# Patient Record
Sex: FEMALE | Race: WHITE
[De-identification: names, ages, dates, MRNs, and addresses within clinical notes are randomized per-mention and may not be internally consistent; named-entity substitution may affect disease eponyms.]

---

## 2017-09-06 LAB
ANION GAP SERPL CALC-SCNC: 13.1 MMOL/L (ref 8–16)
APTT BLD: 27.8 SECONDS (ref 22.8–39.4)
BASOPHILS NFR BLD AUTO: 0.3 % (ref 0–2)
BUN SERPL-MCNC: 10 MG/DL (ref 7–18)
CALCIUM SERPL-MCNC: 8.8 MG/DL (ref 8.5–10.1)
CHLORIDE SERPL-SCNC: 104 MMOL/L (ref 98–107)
CO2 SERPL-SCNC: 28.6 MMOL/L (ref 21–32)
CREAT SERPL-MCNC: 0.7 MG/DL (ref 0.6–1.3)
EOSINOPHIL NFR BLD: 2.5 % (ref 0–7)
ERYTHROCYTE [DISTWIDTH] IN BLOOD BY AUTOMATED COUNT: 13.6 % (ref 11.5–14.5)
GLUCOSE SERPL-MCNC: 144 MG/DL (ref 74–106)
HCT VFR BLD CALC: 35.3 % (ref 36–48)
HGB BLD-MCNC: 11.8 G/DL (ref 12–16)
IMM GRANULOCYTES NFR BLD: 0 % (ref 0–5)
INR PPP: 0.98 (ref 0.85–1.17)
LYMPHOCYTES NFR BLD AUTO: 39.8 % (ref 15–50)
MCH RBC QN AUTO: 29.9 PG (ref 26–34)
MCHC RBC AUTO-ENTMCNC: 33.4 G/DL (ref 31–37)
MCV RBC: 89.4 FL (ref 80–100)
MONOCYTES NFR BLD: 11.7 % (ref 2–11)
NEUTROPHILS NFR BLD AUTO: 45.7 % (ref 40–80)
OSMOLALITY SERPL CALC.SUM OF ELEC: 284 MOSM/KG (ref 275–300)
PLATELET # BLD: 111 10X3/UL (ref 130–400)
PMV BLD AUTO: 12.2 FL (ref 7.4–10.4)
POTASSIUM SERPL-SCNC: 3.7 MMOL/L (ref 3.5–5.1)
PROTHROMBIN TIME: 12.8 SECONDS (ref 11.6–15)
RBC # BLD AUTO: 3.95 10X6/UL (ref 4–5.4)
SODIUM SERPL-SCNC: 142 MMOL/L (ref 136–145)
WBC # BLD AUTO: 3.2 10X3/UL (ref 4.8–10.8)

## 2017-09-07 ENCOUNTER — HOSPITAL ENCOUNTER (OUTPATIENT)
Dept: HOSPITAL 84 - D.OPS | Age: 67
Discharge: HOME | End: 2017-09-07
Attending: SURGERY
Payer: MEDICARE

## 2017-09-07 VITALS — SYSTOLIC BLOOD PRESSURE: 146 MMHG | DIASTOLIC BLOOD PRESSURE: 50 MMHG

## 2017-09-07 VITALS
WEIGHT: 211 LBS | HEIGHT: 67 IN | BODY MASS INDEX: 33.12 KG/M2 | HEIGHT: 67 IN | WEIGHT: 211 LBS | BODY MASS INDEX: 33.12 KG/M2

## 2017-09-07 DIAGNOSIS — E78.5: ICD-10-CM

## 2017-09-07 DIAGNOSIS — M10.9: ICD-10-CM

## 2017-09-07 DIAGNOSIS — E11.9: ICD-10-CM

## 2017-09-07 DIAGNOSIS — M79.7: ICD-10-CM

## 2017-09-07 DIAGNOSIS — K21.9: ICD-10-CM

## 2017-09-07 DIAGNOSIS — K42.9: ICD-10-CM

## 2017-09-07 DIAGNOSIS — Z01.812: ICD-10-CM

## 2017-09-07 DIAGNOSIS — I25.10: ICD-10-CM

## 2017-09-07 DIAGNOSIS — K43.2: Primary | ICD-10-CM

## 2017-09-07 NOTE — NUR
1045--ZOFRAN 4MG GIVEN SIVP FOR NAUSEA, COOL WASH CLOTH PROVIDED. PT
DENIES FURTHER NEEDS, WILL CONTINUE TO MONITOR. AUSTIN PATEL

## 2017-09-07 NOTE — NUR
THIS PATIENT BECOME NAUSEATED AFTER TRANSFERRING BACK TO OUTPATIENT.
CONSULTED ANESTHESIA. GIVEN ORDERS TO ADMINISTER ZOFRAN 4MG IV X1
NOW. THIS WILL BE GIVEN IN OUTPATIENT.

## 2017-09-07 NOTE — NUR
1200--PT VERBALIZES NAUSEA IS GONE, FULL LIQUID TRAY SERVED. PT
DENIES FURTHER NEEDS, WILL CONTINUE TO MONITOR. AUSTIN PATEL

## 2017-09-07 NOTE — NUR
1500--PT VOIDS WITHOUT DIFFICULTY, IV DC'D. AUSTIN PATEL
 
7232--DISCHARGE INSTRUCTIONS GIVEN, PT VERBALIZES UNDERSTANDING. PT
OFF UNIT VIA WC. AUSTIN PATEL

## 2017-09-08 NOTE — OP
PATIENT NAME:  REESE GOMEZ                             MEDICAL RECORD: [id number]
:46                                             LOCATION:D.OPS          
                                                         ADMISSION DATE:        
SURGEON:  THI GUTIERREZ MD          
 
 
DATE OF OPERATION:  2017
 
PREOPERATIVE DIAGNOSES:
1.  Ventral incisional hernia.
2.  Umbilical hernia.
3.  Diabetes mellitus.
4.  Hyperlipidemia.
5.  Coronary artery disease.
6.  Gout.
7.  Gastroesophageal reflux disease.
8.  Fibromyalgia.
 
POSTOPERATIVE DIAGNOSES:
1.  Umbilical hernia.
2.  Diabetes mellitus.
3.  Hyperlipidemia.
4.  Coronary artery disease.
5.  Gout.
6.  Gastroesophageal reflux disease.
7.  Fibromyalgia.
 
PROCEDURE:  
1.  Laparoscopic lysis of adhesions times 30 minutes.
2.  Umbilical hernia repair without mesh.
 
SURGEON:  Thi Gutierrez MD
 
REPORT OF PROCEDURE:  The patient's abdomen was prepped and draped in sterile
fashion.  A Veress needle was inserted in the left upper quadrant and the
abdomen was insufflated.  A 5-mm trocar was placed in the left lateral abdomen. 
We inspected the Veress needle and saw there was no sign of any injury to bowel
or surrounding structures.  Another 5-mm trocar was placed in the right
subcostal region.  There was noted to be some adhesions present in the midline
extending out to the right upper quadrant and underneath a right subcostal
incision from a previous open cholecystectomy.  The lysis of adhesions was
performed of this fatty omental adhesions to the anterior abdominal wall.  This
was done carefully with sharp dissection and electrocautery.  We eventually were
able to get all the tissue off the anterior abdominal wall and could inspect it
closely.  I did a thorough inspection and saw no sign of a hernia being present.
 We could not find any signs at all that there was hernia tissue visible at this
point, we inspected the periumbilical space was able to find a small umbilical
hernia.  At this point, we irrigated out the abdomen thoroughly with normal
saline.  There was some significant blood loss, it was there, but there did not
appear to be any surgical bleeding.  There seemed to be some diffuse oozing from
the omental tissue from the lysis of adhesions.  At this point, the ports and
insufflation were then removed.  A semicircular incision was made on the
inferior aspect of the umbilicus.  The umbilical tissue was transected just
below the umbilicus through the patient's hernia sac.  The hernia defect was
dissected free from the hernia sac all the way down to the fascial edges.  The
hernia defect was about 2 cm wide and about 1 cm from top to bottom.  We freed
up the surface above and below the fascia and then reapproximated the fascia
transversely using interrupted 0 Prolenes times 6.  We then tacked the umbilicus
 
 
 
OPERATIVE REPORT                               [id number]    REESE GOMEZ           
 
 
down using the interrupted 3-0 Vicryl.  After irrigating the wound out, then we
reapproximated the subcutaneous tissues with interrupted 3-0 Vicryls.  We
infused with a  total of 10 mL of the surrounding tissues of all the incisions
and then closed the incisions with subcutaneous 5-0 Monocryl.
 
COMPLICATIONS:  None.
 
CONDITION:  Stable.
 
ANESTHESIA:  General endotracheal and local.
 
BLOOD LOSS:  100 mL.
 
TRANSINT:[id number] Voice Confirmation ID: [id number] DOCUMENT ID: [id number]
                                           
                                           THI GUTIERREZ MD          
 
 
 
Electronically Signed by THI GUTIERREZ on 17 at 1345
 
 
 
 
 
 
 
 
 
 
 
 
 
 
 
 
 
 
 
 
 
 
 
 
 
 
 
CC: LOIS PEÑA MD                                         [id number]
DICTATION DATE: 1742     :     17 1100      [facility] 
                                                                      17
[facility]                                          
[address] [place] [address]

## 2018-02-05 ENCOUNTER — HOSPITAL ENCOUNTER (OUTPATIENT)
Dept: HOSPITAL 84 - D.MAMMO | Age: 68
Discharge: HOME | End: 2018-02-05
Attending: FAMILY MEDICINE
Payer: MEDICARE

## 2018-02-05 VITALS — BODY MASS INDEX: 33.1 KG/M2

## 2018-02-05 DIAGNOSIS — Z12.31: Primary | ICD-10-CM

## 2018-05-21 ENCOUNTER — HOSPITAL ENCOUNTER (OUTPATIENT)
Dept: HOSPITAL 84 - D.CT | Age: 68
Discharge: HOME | End: 2018-05-21
Attending: FAMILY MEDICINE
Payer: MEDICARE

## 2018-05-21 VITALS — BODY MASS INDEX: 33.1 KG/M2

## 2018-05-21 DIAGNOSIS — R10.9: Primary | ICD-10-CM

## 2018-06-13 ENCOUNTER — HOSPITAL ENCOUNTER (OUTPATIENT)
Dept: HOSPITAL 84 - D.LAB | Age: 68
Discharge: HOME | End: 2018-06-13
Attending: INTERNAL MEDICINE
Payer: MEDICARE

## 2018-06-13 VITALS — BODY MASS INDEX: 33.1 KG/M2

## 2018-06-13 DIAGNOSIS — R10.9: ICD-10-CM

## 2018-06-13 DIAGNOSIS — R12: ICD-10-CM

## 2018-06-13 DIAGNOSIS — R19.7: ICD-10-CM

## 2018-06-13 DIAGNOSIS — R93.8: Primary | ICD-10-CM

## 2018-06-13 LAB
ALBUMIN SERPL-MCNC: 3 G/DL (ref 3.4–5)
ALP SERPL-CCNC: 106 U/L (ref 46–116)
ALT SERPL-CCNC: 43 U/L (ref 10–68)
BILIRUB DIRECT SERPL-MCNC: 0.21 MG/DL (ref 0–0.3)
BILIRUB INDIRECT SERPL-MCNC: 0.39 MG/DL (ref 0–1)
BILIRUB SERPL-MCNC: 0.6 MG/DL (ref 0.2–1.3)
GLOBULIN SER-MCNC: 3.1 G/L
PROT SERPL-MCNC: 6.1 G/DL (ref 6.4–8.2)

## 2018-08-16 ENCOUNTER — HOSPITAL ENCOUNTER (OUTPATIENT)
Dept: HOSPITAL 84 - D.SP | Age: 68
Discharge: HOME | End: 2018-08-16
Attending: INTERNAL MEDICINE
Payer: MEDICARE

## 2018-08-16 VITALS — WEIGHT: 202.42 LBS | HEIGHT: 67 IN | BODY MASS INDEX: 31.77 KG/M2 | BODY MASS INDEX: 31.77 KG/M2

## 2018-08-16 VITALS — SYSTOLIC BLOOD PRESSURE: 126 MMHG | DIASTOLIC BLOOD PRESSURE: 50 MMHG

## 2018-08-16 DIAGNOSIS — R18.8: Primary | ICD-10-CM

## 2018-08-16 DIAGNOSIS — Z01.812: ICD-10-CM

## 2018-08-16 LAB
ANION GAP SERPL CALC-SCNC: 10.8 MMOL/L (ref 8–16)
APTT BLD: 32.8 SECONDS (ref 22.8–39.4)
BASOPHILS NFR BLD AUTO: 0.7 % (ref 0–2)
BUN SERPL-MCNC: 11 MG/DL (ref 7–18)
CALCIUM SERPL-MCNC: 8.3 MG/DL (ref 8.5–10.1)
CHLORIDE SERPL-SCNC: 106 MMOL/L (ref 98–107)
CO2 SERPL-SCNC: 29.1 MMOL/L (ref 21–32)
CREAT SERPL-MCNC: 0.7 MG/DL (ref 0.6–1.3)
EOSINOPHIL NFR BLD: 3.1 % (ref 0–7)
ERYTHROCYTE [DISTWIDTH] IN BLOOD BY AUTOMATED COUNT: 13.8 % (ref 11.5–14.5)
GLUCOSE SERPL-MCNC: 124 MG/DL (ref 74–106)
HCT VFR BLD CALC: 31.5 % (ref 36–48)
HGB BLD-MCNC: 10.1 G/DL (ref 12–16)
IMM GRANULOCYTES NFR BLD: 0 % (ref 0–5)
INR PPP: 1.16 (ref 0.85–1.17)
LYMPHOCYTES NFR BLD AUTO: 40.3 % (ref 15–50)
MACROPHAGES NFR FLD MANUAL: 19 %
MCH RBC QN AUTO: 28.2 PG (ref 26–34)
MCHC RBC AUTO-ENTMCNC: 32.1 G/DL (ref 31–37)
MCV RBC: 88 FL (ref 80–100)
MESOTHL CELL NFR FLD MANUAL: 18 %
MONOCYTES NFR BLD: 9.9 % (ref 2–11)
NEUTROPHILS NFR BLD AUTO: 46 % (ref 40–80)
NEUTROPHILS NFR FLD MANUAL: 1 %
OSMOLALITY SERPL CALC.SUM OF ELEC: 282 MOSM/KG (ref 275–300)
PLATELET # BLD: 119 10X3/UL (ref 130–400)
PMV BLD AUTO: 11.4 FL (ref 7.4–10.4)
POTASSIUM SERPL-SCNC: 3.9 MMOL/L (ref 3.5–5.1)
PROTHROMBIN TIME: 14.4 SECONDS (ref 11.6–15)
RBC # BLD AUTO: 3.58 10X6/UL (ref 4–5.4)
SODIUM SERPL-SCNC: 142 MMOL/L (ref 136–145)
WBC # BLD AUTO: 2.9 10X3/UL (ref 4.8–10.8)

## 2018-08-20 ENCOUNTER — HOSPITAL ENCOUNTER (OUTPATIENT)
Dept: HOSPITAL 84 - D.MRI | Age: 68
Discharge: HOME | End: 2018-08-20
Attending: INTERNAL MEDICINE
Payer: MEDICARE

## 2018-08-20 VITALS — BODY MASS INDEX: 31.7 KG/M2

## 2018-08-20 DIAGNOSIS — D36.9: ICD-10-CM

## 2018-08-20 DIAGNOSIS — K76.6: Primary | ICD-10-CM

## 2018-08-20 DIAGNOSIS — R93.8: ICD-10-CM

## 2018-08-20 DIAGNOSIS — I83.90: ICD-10-CM

## 2018-08-20 LAB
IRON SERPL-MCNC: 33 UG/DL (ref 35–150)
SAO2 % BLD FROM PO2: 8 % (ref 15–55)
TIBC SERPL-MCNC: 394 UG/DL (ref 260–445)
UIBC SERPL-MCNC: 361 UG/DL (ref 150–375)

## 2018-08-21 LAB
AFP-TM SERPL-MCNC: 6.2 NG/ML (ref 0–8.3)
HCV AB S/CO SERPL IA: 0.1 (ref 0–0.9)

## 2018-08-22 LAB
ACTIN IGG SERPL-ACNC: 11 UNITS (ref 0–19)
MITOCHONDRIA M2 IGG SER-ACNC: 31.5 UNITS (ref 0–20)

## 2018-09-21 ENCOUNTER — HOSPITAL ENCOUNTER (OUTPATIENT)
Dept: HOSPITAL 84 - D.SP | Age: 68
Discharge: HOME | End: 2018-09-21
Attending: INTERNAL MEDICINE
Payer: MEDICARE

## 2018-09-21 VITALS — DIASTOLIC BLOOD PRESSURE: 61 MMHG | SYSTOLIC BLOOD PRESSURE: 136 MMHG

## 2018-09-21 VITALS — HEIGHT: 67 IN | WEIGHT: 195.41 LBS | BODY MASS INDEX: 30.67 KG/M2

## 2018-09-21 DIAGNOSIS — K74.60: Primary | ICD-10-CM

## 2018-09-21 DIAGNOSIS — E11.65: ICD-10-CM

## 2018-09-21 DIAGNOSIS — E11.40: ICD-10-CM

## 2018-09-21 DIAGNOSIS — G25.81: ICD-10-CM

## 2018-09-21 DIAGNOSIS — Z01.812: ICD-10-CM

## 2018-09-21 LAB
ANION GAP SERPL CALC-SCNC: 9.8 MMOL/L (ref 8–16)
APTT BLD: 30.6 SECONDS (ref 22.8–39.4)
BASOPHILS NFR BLD AUTO: 0.3 % (ref 0–2)
BUN SERPL-MCNC: 12 MG/DL (ref 7–18)
CALCIUM SERPL-MCNC: 8.4 MG/DL (ref 8.5–10.1)
CHLORIDE SERPL-SCNC: 106 MMOL/L (ref 98–107)
CO2 SERPL-SCNC: 28.6 MMOL/L (ref 21–32)
CREAT SERPL-MCNC: 0.9 MG/DL (ref 0.6–1.3)
EOSINOPHIL NFR BLD: 2.7 % (ref 0–7)
ERYTHROCYTE [DISTWIDTH] IN BLOOD BY AUTOMATED COUNT: 15.6 % (ref 11.5–14.5)
GLUCOSE SERPL-MCNC: 133 MG/DL (ref 74–106)
HCT VFR BLD CALC: 29.9 % (ref 36–48)
HGB BLD-MCNC: 9.6 G/DL (ref 12–16)
IMM GRANULOCYTES NFR BLD: 0 % (ref 0–5)
INR PPP: 1.16 (ref 0.85–1.17)
LYMPHOCYTES NFR BLD AUTO: 39.1 % (ref 15–50)
MCH RBC QN AUTO: 26.9 PG (ref 26–34)
MCHC RBC AUTO-ENTMCNC: 32.1 G/DL (ref 31–37)
MCV RBC: 83.8 FL (ref 80–100)
MONOCYTES NFR BLD: 10.8 % (ref 2–11)
NEUTROPHILS NFR BLD AUTO: 47.1 % (ref 40–80)
OSMOLALITY SERPL CALC.SUM OF ELEC: 282 MOSM/KG (ref 275–300)
PLATELET # BLD EST: (no result) 10*3/UL
PLATELET # BLD: 94 10X3/UL (ref 130–400)
PMV BLD AUTO: 10.8 FL (ref 7.4–10.4)
POTASSIUM SERPL-SCNC: 3.4 MMOL/L (ref 3.5–5.1)
PROTHROMBIN TIME: 14.4 SECONDS (ref 11.6–15)
RBC # BLD AUTO: 3.57 10X6/UL (ref 4–5.4)
SODIUM SERPL-SCNC: 141 MMOL/L (ref 136–145)
WBC # BLD AUTO: 3 10X3/UL (ref 4.8–10.8)

## 2018-11-27 ENCOUNTER — HOSPITAL ENCOUNTER (OUTPATIENT)
Dept: HOSPITAL 84 - D.CT | Age: 68
Discharge: HOME | End: 2018-11-27
Attending: FAMILY MEDICINE
Payer: MEDICARE

## 2018-11-27 VITALS — BODY MASS INDEX: 30.6 KG/M2

## 2018-11-27 DIAGNOSIS — R10.9: Primary | ICD-10-CM

## 2018-12-05 ENCOUNTER — HOSPITAL ENCOUNTER (INPATIENT)
Dept: HOSPITAL 84 - D.MS | Age: 68
LOS: 3 days | Discharge: HOME | DRG: 433 | End: 2018-12-08
Attending: FAMILY MEDICINE | Admitting: FAMILY MEDICINE
Payer: MEDICARE

## 2018-12-05 VITALS — WEIGHT: 200 LBS | BODY MASS INDEX: 31.39 KG/M2 | HEIGHT: 67 IN | BODY MASS INDEX: 31.39 KG/M2

## 2018-12-05 VITALS — SYSTOLIC BLOOD PRESSURE: 148 MMHG | DIASTOLIC BLOOD PRESSURE: 53 MMHG

## 2018-12-05 VITALS — SYSTOLIC BLOOD PRESSURE: 148 MMHG | DIASTOLIC BLOOD PRESSURE: 47 MMHG

## 2018-12-05 DIAGNOSIS — E87.6: ICD-10-CM

## 2018-12-05 DIAGNOSIS — E78.5: ICD-10-CM

## 2018-12-05 DIAGNOSIS — E11.9: ICD-10-CM

## 2018-12-05 DIAGNOSIS — K74.60: Primary | ICD-10-CM

## 2018-12-05 DIAGNOSIS — D61.818: ICD-10-CM

## 2018-12-05 DIAGNOSIS — I10: ICD-10-CM

## 2018-12-05 DIAGNOSIS — K21.9: ICD-10-CM

## 2018-12-05 DIAGNOSIS — I25.10: ICD-10-CM

## 2018-12-05 DIAGNOSIS — L03.311: ICD-10-CM

## 2018-12-05 LAB
ALBUMIN SERPL-MCNC: 3 G/DL (ref 3.4–5)
ALP SERPL-CCNC: 67 U/L (ref 46–116)
ALT SERPL-CCNC: 21 U/L (ref 10–68)
AMYLASE SERPL-CCNC: 9 U/L (ref 25–115)
ANION GAP SERPL CALC-SCNC: 11.3 MMOL/L (ref 8–16)
APPEARANCE UR: CLEAR
BACTERIA #/AREA URNS HPF: (no result) /HPF
BASOPHILS NFR BLD AUTO: 0.3 % (ref 0–2)
BILIRUB SERPL-MCNC: 0.5 MG/DL (ref 0.2–1.3)
BILIRUB SERPL-MCNC: NEGATIVE MG/DL
BUN SERPL-MCNC: 12 MG/DL (ref 7–18)
CALCIUM SERPL-MCNC: 8.4 MG/DL (ref 8.5–10.1)
CHLORIDE SERPL-SCNC: 105 MMOL/L (ref 98–107)
CO2 SERPL-SCNC: 27.9 MMOL/L (ref 21–32)
COLOR UR: YELLOW
CREAT SERPL-MCNC: 0.7 MG/DL (ref 0.6–1.3)
EOSINOPHIL NFR BLD: 1.9 % (ref 0–7)
ERYTHROCYTE [DISTWIDTH] IN BLOOD BY AUTOMATED COUNT: 16 % (ref 11.5–14.5)
GLOBULIN SER-MCNC: 2.3 G/L
GLUCOSE SERPL-MCNC: 81 MG/DL (ref 74–106)
GLUCOSE SERPL-MCNC: NEGATIVE MG/DL
HCT VFR BLD CALC: 32.3 % (ref 36–48)
HGB BLD-MCNC: 10.7 G/DL (ref 12–16)
IMM GRANULOCYTES NFR BLD: 0.3 % (ref 0–5)
KETONES UR STRIP-MCNC: NEGATIVE MG/DL
LIPASE SERPL-CCNC: 124 U/L (ref 73–393)
LYMPHOCYTES NFR BLD AUTO: 33.5 % (ref 15–50)
MCH RBC QN AUTO: 28.9 PG (ref 26–34)
MCHC RBC AUTO-ENTMCNC: 33.1 G/DL (ref 31–37)
MCV RBC: 87.3 FL (ref 80–100)
MONOCYTES NFR BLD: 11.9 % (ref 2–11)
NEUTROPHILS NFR BLD AUTO: 52.1 % (ref 40–80)
NITRITE UR-MCNC: NEGATIVE MG/ML
OSMOLALITY SERPL CALC.SUM OF ELEC: 279 MOSM/KG (ref 275–300)
PH UR STRIP: 5 [PH] (ref 5–6)
PLATELET # BLD: 137 10X3/UL (ref 130–400)
PMV BLD AUTO: 11.1 FL (ref 7.4–10.4)
POTASSIUM SERPL-SCNC: 3.2 MMOL/L (ref 3.5–5.1)
PROT SERPL-MCNC: 5.3 G/DL (ref 6.4–8.2)
PROT UR-MCNC: NEGATIVE MG/DL
RBC # BLD AUTO: 3.7 10X6/UL (ref 4–5.4)
SODIUM SERPL-SCNC: 141 MMOL/L (ref 136–145)
SP GR UR STRIP: 1.01 (ref 1–1.02)
SQUAMOUS #/AREA URNS HPF: (no result) /HPF (ref 0–5)
UROBILINOGEN UR-MCNC: NORMAL MG/DL
WBC # BLD AUTO: 3.6 10X3/UL (ref 4.8–10.8)
WBC #/AREA URNS HPF: (no result) /HPF (ref 0–5)

## 2018-12-06 VITALS — SYSTOLIC BLOOD PRESSURE: 136 MMHG | DIASTOLIC BLOOD PRESSURE: 60 MMHG

## 2018-12-06 VITALS — SYSTOLIC BLOOD PRESSURE: 111 MMHG | DIASTOLIC BLOOD PRESSURE: 53 MMHG

## 2018-12-06 VITALS — SYSTOLIC BLOOD PRESSURE: 150 MMHG | DIASTOLIC BLOOD PRESSURE: 50 MMHG

## 2018-12-06 VITALS — DIASTOLIC BLOOD PRESSURE: 52 MMHG | SYSTOLIC BLOOD PRESSURE: 148 MMHG

## 2018-12-06 VITALS — DIASTOLIC BLOOD PRESSURE: 56 MMHG | SYSTOLIC BLOOD PRESSURE: 151 MMHG

## 2018-12-06 LAB
%HYPO/RBC NFR BLD AUTO: (no result) %
ANION GAP SERPL CALC-SCNC: 12.6 MMOL/L (ref 8–16)
BUN SERPL-MCNC: 9 MG/DL (ref 7–18)
CALCIUM SERPL-MCNC: 8.1 MG/DL (ref 8.5–10.1)
CHLORIDE SERPL-SCNC: 108 MMOL/L (ref 98–107)
CO2 SERPL-SCNC: 28.7 MMOL/L (ref 21–32)
CREAT SERPL-MCNC: 0.7 MG/DL (ref 0.6–1.3)
EOSINOPHIL NFR BLD: 2 % (ref 0–7)
ERYTHROCYTE [DISTWIDTH] IN BLOOD BY AUTOMATED COUNT: 16.1 % (ref 11.5–14.5)
GLUCOSE SERPL-MCNC: 102 MG/DL (ref 74–106)
HCT VFR BLD CALC: 32 % (ref 36–48)
HGB BLD-MCNC: 10.6 G/DL (ref 12–16)
INR PPP: 1.18 (ref 0.85–1.17)
IRON SERPL-MCNC: 57 UG/DL (ref 35–150)
LYMPHOCYTES NFR BLD AUTO: 45 % (ref 15–50)
MCH RBC QN AUTO: 28.7 PG (ref 26–34)
MCHC RBC AUTO-ENTMCNC: 33.1 G/DL (ref 31–37)
MCV RBC: 86.7 FL (ref 80–100)
MONOCYTES NFR BLD: 9 % (ref 2–11)
NEUTROPHILS NFR BLD AUTO: 44 % (ref 40–80)
OSMOLALITY SERPL CALC.SUM OF ELEC: 289 MOSM/KG (ref 275–300)
PLATELET # BLD EST: (no result) 10*3/UL
PLATELET # BLD: 117 10X3/UL (ref 130–400)
PMV BLD AUTO: 10.3 FL (ref 7.4–10.4)
POTASSIUM SERPL-SCNC: 3.3 MMOL/L (ref 3.5–5.1)
PROTHROMBIN TIME: 14.5 SECONDS (ref 11.6–15)
RBC # BLD AUTO: 3.69 10X6/UL (ref 4–5.4)
ROULEAUX BLD QL SMEAR: (no result)
SAO2 % BLD FROM PO2: 20 % (ref 15–55)
SODIUM SERPL-SCNC: 146 MMOL/L (ref 136–145)
TIBC SERPL-MCNC: 272 UG/DL (ref 260–445)
UIBC SERPL-MCNC: 215 UG/DL (ref 150–375)
WBC # BLD AUTO: 2.8 10X3/UL (ref 4.8–10.8)

## 2018-12-07 VITALS — SYSTOLIC BLOOD PRESSURE: 154 MMHG | DIASTOLIC BLOOD PRESSURE: 47 MMHG

## 2018-12-07 VITALS — DIASTOLIC BLOOD PRESSURE: 52 MMHG | SYSTOLIC BLOOD PRESSURE: 139 MMHG

## 2018-12-07 VITALS — SYSTOLIC BLOOD PRESSURE: 132 MMHG | DIASTOLIC BLOOD PRESSURE: 45 MMHG

## 2018-12-07 VITALS — DIASTOLIC BLOOD PRESSURE: 42 MMHG | SYSTOLIC BLOOD PRESSURE: 121 MMHG

## 2018-12-07 VITALS — SYSTOLIC BLOOD PRESSURE: 161 MMHG | DIASTOLIC BLOOD PRESSURE: 55 MMHG

## 2018-12-07 VITALS — SYSTOLIC BLOOD PRESSURE: 112 MMHG | DIASTOLIC BLOOD PRESSURE: 40 MMHG

## 2018-12-07 LAB
ALBUMIN SERPL-MCNC: 2.5 G/DL (ref 3.4–5)
ALP SERPL-CCNC: 67 U/L (ref 46–116)
ALT SERPL-CCNC: 17 U/L (ref 10–68)
AMYLASE FLD-CCNC: 13 U/L
ANION GAP SERPL CALC-SCNC: 11.3 MMOL/L (ref 8–16)
APTT BLD: 36.9 SECONDS (ref 22.8–39.4)
BASOPHILS NFR BLD AUTO: 0.4 % (ref 0–2)
BILIRUB SERPL-MCNC: 0.41 MG/DL (ref 0.2–1.3)
BUN SERPL-MCNC: 11 MG/DL (ref 7–18)
CALCIUM SERPL-MCNC: 7.8 MG/DL (ref 8.5–10.1)
CHLORIDE SERPL-SCNC: 106 MMOL/L (ref 98–107)
CO2 SERPL-SCNC: 26.1 MMOL/L (ref 21–32)
CREAT SERPL-MCNC: 0.7 MG/DL (ref 0.6–1.3)
EOSINOPHIL NFR BLD: 2.4 % (ref 0–7)
ERYTHROCYTE [DISTWIDTH] IN BLOOD BY AUTOMATED COUNT: 16 % (ref 11.5–14.5)
FOLATE SERPL-MCNC: 12.2 NG/ML (ref 3–?)
GLOBULIN SER-MCNC: 3.4 G/L
GLUCOSE - BODY FLUID: 90 MG/DL
GLUCOSE SERPL-MCNC: 89 MG/DL (ref 74–106)
HCT VFR BLD CALC: 28.9 % (ref 36–48)
HGB BLD-MCNC: 9.3 G/DL (ref 12–16)
IMM GRANULOCYTES NFR BLD: 0 % (ref 0–5)
INR PPP: 1.23 (ref 0.85–1.17)
LYMPHOCYTES NFR BLD AUTO: 42 % (ref 15–50)
MACROPHAGES NFR FLD MANUAL: 16 %
MCH RBC QN AUTO: 28.1 PG (ref 26–34)
MCHC RBC AUTO-ENTMCNC: 32.2 G/DL (ref 31–37)
MCV RBC: 87.3 FL (ref 80–100)
MESOTHL CELL NFR FLD MANUAL: 13 %
MONOCYTES NFR BLD: 15.2 % (ref 2–11)
NEUTROPHILS NFR BLD AUTO: 40 % (ref 40–80)
NEUTROPHILS NFR FLD MANUAL: 7 %
OSMOLALITY SERPL CALC.SUM OF ELEC: 276 MOSM/KG (ref 275–300)
PLATELET # BLD: 109 10X3/UL (ref 130–400)
PMV BLD AUTO: 11.5 FL (ref 7.4–10.4)
POTASSIUM SERPL-SCNC: 3.4 MMOL/L (ref 3.5–5.1)
PROT SERPL-MCNC: 5.9 G/DL (ref 6.4–8.2)
PROTEIN - BODY FLUID: 1.4 G/DL
PROTHROMBIN TIME: 15 SECONDS (ref 11.6–15)
RBC # BLD AUTO: 3.31 10X6/UL (ref 4–5.4)
SODIUM SERPL-SCNC: 140 MMOL/L (ref 136–145)
VIT B12 SERPL-MCNC: >2000 PG/ML (ref 232–1245)
WBC # BLD AUTO: 2.5 10X3/UL (ref 4.8–10.8)

## 2018-12-07 PROCEDURE — 0W9G3ZZ DRAINAGE OF PERITONEAL CAVITY, PERCUTANEOUS APPROACH: ICD-10-PCS | Performed by: RADIOLOGY

## 2018-12-08 VITALS — SYSTOLIC BLOOD PRESSURE: 133 MMHG | DIASTOLIC BLOOD PRESSURE: 51 MMHG

## 2018-12-08 VITALS — SYSTOLIC BLOOD PRESSURE: 112 MMHG | DIASTOLIC BLOOD PRESSURE: 47 MMHG

## 2018-12-08 VITALS — DIASTOLIC BLOOD PRESSURE: 49 MMHG | SYSTOLIC BLOOD PRESSURE: 136 MMHG

## 2018-12-08 VITALS — SYSTOLIC BLOOD PRESSURE: 136 MMHG | DIASTOLIC BLOOD PRESSURE: 49 MMHG

## 2018-12-08 VITALS — SYSTOLIC BLOOD PRESSURE: 111 MMHG | DIASTOLIC BLOOD PRESSURE: 42 MMHG

## 2018-12-08 VITALS — SYSTOLIC BLOOD PRESSURE: 119 MMHG | DIASTOLIC BLOOD PRESSURE: 88 MMHG

## 2018-12-08 LAB
ANION GAP SERPL CALC-SCNC: 9.6 MMOL/L (ref 8–16)
BASOPHILS NFR BLD AUTO: 0.4 % (ref 0–2)
BUN SERPL-MCNC: 9 MG/DL (ref 7–18)
CALCIUM SERPL-MCNC: 7.7 MG/DL (ref 8.5–10.1)
CHLORIDE SERPL-SCNC: 105 MMOL/L (ref 98–107)
CO2 SERPL-SCNC: 28.2 MMOL/L (ref 21–32)
CREAT SERPL-MCNC: 0.7 MG/DL (ref 0.6–1.3)
EOSINOPHIL NFR BLD: 2.2 % (ref 0–7)
ERYTHROCYTE [DISTWIDTH] IN BLOOD BY AUTOMATED COUNT: 15.7 % (ref 11.5–14.5)
GLUCOSE SERPL-MCNC: 88 MG/DL (ref 74–106)
HCT VFR BLD CALC: 28.3 % (ref 36–48)
HGB BLD-MCNC: 9.3 G/DL (ref 12–16)
IMM GRANULOCYTES NFR BLD: 0 % (ref 0–5)
LYMPHOCYTES NFR BLD AUTO: 46.6 % (ref 15–50)
MAGNESIUM SERPL-MCNC: 1.2 MG/DL (ref 1.8–2.4)
MCH RBC QN AUTO: 28.6 PG (ref 26–34)
MCHC RBC AUTO-ENTMCNC: 32.9 G/DL (ref 31–37)
MCV RBC: 87.1 FL (ref 80–100)
MONOCYTES NFR BLD: 12.1 % (ref 2–11)
NEUTROPHILS NFR BLD AUTO: 38.7 % (ref 40–80)
OSMOLALITY SERPL CALC.SUM OF ELEC: 275 MOSM/KG (ref 275–300)
PLATELET # BLD: 107 10X3/UL (ref 130–400)
PMV BLD AUTO: 11.2 FL (ref 7.4–10.4)
POTASSIUM SERPL-SCNC: 3.8 MMOL/L (ref 3.5–5.1)
RBC # BLD AUTO: 3.25 10X6/UL (ref 4–5.4)
SODIUM SERPL-SCNC: 139 MMOL/L (ref 136–145)
WBC # BLD AUTO: 2.3 10X3/UL (ref 4.8–10.8)

## 2019-02-19 ENCOUNTER — HOSPITAL ENCOUNTER (OUTPATIENT)
Dept: HOSPITAL 84 - D.US | Age: 69
Discharge: HOME | End: 2019-02-19
Attending: INTERNAL MEDICINE
Payer: MEDICARE

## 2019-02-19 VITALS — BODY MASS INDEX: 31.3 KG/M2

## 2019-02-19 DIAGNOSIS — K76.0: Primary | ICD-10-CM

## 2019-02-19 DIAGNOSIS — K74.60: ICD-10-CM

## 2019-02-19 DIAGNOSIS — R94.5: ICD-10-CM

## 2019-02-19 LAB
ALBUMIN SERPL-MCNC: 3.3 G/DL (ref 3.4–5)
ALP SERPL-CCNC: 74 U/L (ref 46–116)
ALT SERPL-CCNC: 30 U/L (ref 10–68)
BASOPHILS NFR BLD AUTO: 0.8 % (ref 0–2)
BILIRUB DIRECT SERPL-MCNC: 0.13 MG/DL (ref 0–0.3)
BILIRUB INDIRECT SERPL-MCNC: 0.22 MG/DL (ref 0–1)
BILIRUB SERPL-MCNC: 0.35 MG/DL (ref 0.2–1.3)
EOSINOPHIL NFR BLD: 4 % (ref 0–7)
ERYTHROCYTE [DISTWIDTH] IN BLOOD BY AUTOMATED COUNT: 14.2 % (ref 11.5–14.5)
GLOBULIN SER-MCNC: 3.7 G/L
HCT VFR BLD CALC: 30.9 % (ref 36–48)
HGB BLD-MCNC: 10.1 G/DL (ref 12–16)
IMM GRANULOCYTES NFR BLD: 0.3 % (ref 0–5)
INR PPP: 1.09 (ref 0.85–1.17)
LYMPHOCYTES NFR BLD AUTO: 31.8 % (ref 15–50)
MCH RBC QN AUTO: 28.5 PG (ref 26–34)
MCHC RBC AUTO-ENTMCNC: 32.7 G/DL (ref 31–37)
MCV RBC: 87 FL (ref 80–100)
MONOCYTES NFR BLD: 11.7 % (ref 2–11)
NEUTROPHILS NFR BLD AUTO: 51.4 % (ref 40–80)
PLATELET # BLD: 139 10X3/UL (ref 130–400)
PMV BLD AUTO: 11.6 FL (ref 7.4–10.4)
PROT SERPL-MCNC: 7 G/DL (ref 6.4–8.2)
PROTHROMBIN TIME: 13.6 SECONDS (ref 11.6–15)
RBC # BLD AUTO: 3.55 10X6/UL (ref 4–5.4)
WBC # BLD AUTO: 3.8 10X3/UL (ref 4.8–10.8)

## 2019-08-19 ENCOUNTER — HOSPITAL ENCOUNTER (OUTPATIENT)
Dept: HOSPITAL 84 - D.US | Age: 69
Discharge: HOME | End: 2019-08-19
Attending: INTERNAL MEDICINE
Payer: MEDICARE

## 2019-08-19 ENCOUNTER — HOSPITAL ENCOUNTER (INPATIENT)
Dept: HOSPITAL 84 - D.ER | Age: 69
LOS: 1 days | Discharge: HOME | DRG: 812 | End: 2019-08-20
Attending: FAMILY MEDICINE | Admitting: FAMILY MEDICINE
Payer: MEDICARE

## 2019-08-19 VITALS — BODY MASS INDEX: 31.3 KG/M2

## 2019-08-19 VITALS
HEIGHT: 67 IN | BODY MASS INDEX: 29.88 KG/M2 | HEIGHT: 67 IN | BODY MASS INDEX: 29.88 KG/M2 | WEIGHT: 190.4 LBS | WEIGHT: 190.4 LBS | BODY MASS INDEX: 29.88 KG/M2

## 2019-08-19 VITALS — SYSTOLIC BLOOD PRESSURE: 148 MMHG | DIASTOLIC BLOOD PRESSURE: 46 MMHG

## 2019-08-19 VITALS — SYSTOLIC BLOOD PRESSURE: 139 MMHG | DIASTOLIC BLOOD PRESSURE: 53 MMHG

## 2019-08-19 DIAGNOSIS — I10: ICD-10-CM

## 2019-08-19 DIAGNOSIS — K74.60: ICD-10-CM

## 2019-08-19 DIAGNOSIS — E11.65: ICD-10-CM

## 2019-08-19 DIAGNOSIS — M79.7: ICD-10-CM

## 2019-08-19 DIAGNOSIS — D64.9: Primary | ICD-10-CM

## 2019-08-19 DIAGNOSIS — K74.60: Primary | ICD-10-CM

## 2019-08-19 DIAGNOSIS — K75.81: ICD-10-CM

## 2019-08-19 DIAGNOSIS — I25.10: ICD-10-CM

## 2019-08-19 DIAGNOSIS — K21.9: ICD-10-CM

## 2019-08-19 DIAGNOSIS — E11.40: ICD-10-CM

## 2019-08-19 LAB
ALBUMIN SERPL-MCNC: 3 G/DL (ref 3.4–5)
ALBUMIN SERPL-MCNC: 3.2 G/DL (ref 3.4–5)
ALP SERPL-CCNC: 79 U/L (ref 46–116)
ALP SERPL-CCNC: 89 U/L (ref 46–116)
ALT SERPL-CCNC: 28 U/L (ref 10–68)
ALT SERPL-CCNC: 29 U/L (ref 10–68)
ANION GAP SERPL CALC-SCNC: 17.5 MMOL/L (ref 8–16)
BASOPHILS NFR BLD AUTO: 0.3 % (ref 0–2)
BILIRUB DIRECT SERPL-MCNC: 0.11 MG/DL (ref 0–0.3)
BILIRUB INDIRECT SERPL-MCNC: 0.33 MG/DL (ref 0–1)
BILIRUB SERPL-MCNC: 0.38 MG/DL (ref 0.2–1.3)
BILIRUB SERPL-MCNC: 0.44 MG/DL (ref 0.2–1.3)
BUN SERPL-MCNC: 13 MG/DL (ref 7–18)
CALCIUM SERPL-MCNC: 8.9 MG/DL (ref 8.5–10.1)
CHLORIDE SERPL-SCNC: 102 MMOL/L (ref 98–107)
CO2 SERPL-SCNC: 20.6 MMOL/L (ref 21–32)
CREAT SERPL-MCNC: 1 MG/DL (ref 0.6–1.3)
EOSINOPHIL NFR BLD: 2.2 % (ref 0–7)
ERYTHROCYTE [DISTWIDTH] IN BLOOD BY AUTOMATED COUNT: 15.2 % (ref 11.5–14.5)
ERYTHROCYTE [DISTWIDTH] IN BLOOD BY AUTOMATED COUNT: 15.3 % (ref 11.5–14.5)
GLOBULIN SER-MCNC: 3.4 G/L
GLOBULIN SER-MCNC: 3.9 G/L
GLUCOSE SERPL-MCNC: 285 MG/DL (ref 74–106)
HCT VFR BLD CALC: 23.7 % (ref 36–48)
HCT VFR BLD CALC: 25.2 % (ref 36–48)
HGB BLD-MCNC: 7.5 G/DL (ref 12–16)
HGB BLD-MCNC: 8 G/DL (ref 12–16)
IMM GRANULOCYTES NFR BLD: 0 % (ref 0–5)
INR PPP: 1.12 (ref 0.85–1.17)
LYMPHOCYTES NFR BLD AUTO: 32 % (ref 15–50)
LYMPHOCYTES NFR BLD AUTO: 33.1 % (ref 15–50)
MCH RBC QN AUTO: 24.4 PG (ref 26–34)
MCH RBC QN AUTO: 24.6 PG (ref 26–34)
MCHC RBC AUTO-ENTMCNC: 31.6 G/DL (ref 31–37)
MCHC RBC AUTO-ENTMCNC: 31.7 G/DL (ref 31–37)
MCV RBC: 77.2 FL (ref 80–100)
MCV RBC: 77.5 FL (ref 80–100)
MONOCYTES NFR BLD: 14.4 % (ref 2–11)
MONOCYTES NFR BLD: 15 % (ref 2–11)
NEUTROPHILS NFR BLD AUTO: 50 % (ref 40–80)
NEUTROPHILS NFR BLD AUTO: 52 % (ref 40–80)
OSMOLALITY SERPL CALC.SUM OF ELEC: 281 MOSM/KG (ref 275–300)
PLATELET # BLD EST: (no result) 10*3/UL
PLATELET # BLD: 113 10X3/UL (ref 130–400)
PLATELET # BLD: 130 10X3/UL (ref 130–400)
PMV BLD AUTO: 10.3 FL (ref 7.4–10.4)
PMV BLD AUTO: 10.4 FL (ref 7.4–10.4)
POTASSIUM SERPL-SCNC: 4.1 MMOL/L (ref 3.5–5.1)
PROT SERPL-MCNC: 6.4 G/DL (ref 6.4–8.2)
PROT SERPL-MCNC: 7.1 G/DL (ref 6.4–8.2)
PROTHROMBIN TIME: 13.9 SECONDS (ref 11.6–15)
RBC # BLD AUTO: 3.07 10X6/UL (ref 4–5.4)
RBC # BLD AUTO: 3.25 10X6/UL (ref 4–5.4)
ROULEAUX BLD QL SMEAR: (no result)
SODIUM SERPL-SCNC: 136 MMOL/L (ref 136–145)
WBC # BLD AUTO: 2.8 10X3/UL (ref 4.8–10.8)
WBC # BLD AUTO: 3.6 10X3/UL (ref 4.8–10.8)

## 2019-08-20 VITALS — SYSTOLIC BLOOD PRESSURE: 145 MMHG | DIASTOLIC BLOOD PRESSURE: 46 MMHG

## 2019-08-20 VITALS — SYSTOLIC BLOOD PRESSURE: 130 MMHG | DIASTOLIC BLOOD PRESSURE: 43 MMHG

## 2019-08-20 VITALS — SYSTOLIC BLOOD PRESSURE: 89 MMHG | DIASTOLIC BLOOD PRESSURE: 43 MMHG

## 2019-08-20 LAB
ALBUMIN SERPL-MCNC: 3.1 G/DL (ref 3.4–5)
ALP SERPL-CCNC: 79 U/L (ref 46–116)
ALT SERPL-CCNC: 30 U/L (ref 10–68)
ANION GAP SERPL CALC-SCNC: 14.9 MMOL/L (ref 8–16)
BASOPHILS NFR BLD AUTO: 0.3 % (ref 0–2)
BILIRUB SERPL-MCNC: 1.19 MG/DL (ref 0.2–1.3)
BUN SERPL-MCNC: 15 MG/DL (ref 7–18)
CALCIUM SERPL-MCNC: 8.4 MG/DL (ref 8.5–10.1)
CHLORIDE SERPL-SCNC: 105 MMOL/L (ref 98–107)
CO2 SERPL-SCNC: 23.2 MMOL/L (ref 21–32)
CREAT SERPL-MCNC: 1.1 MG/DL (ref 0.6–1.3)
EOSINOPHIL NFR BLD: 2.1 % (ref 0–7)
ERYTHROCYTE [DISTWIDTH] IN BLOOD BY AUTOMATED COUNT: 15.4 % (ref 11.5–14.5)
FERRITIN SERPL-MCNC: 10 NG/ML (ref 3–244)
GLOBULIN SER-MCNC: 3.2 G/L
GLUCOSE SERPL-MCNC: 168 MG/DL (ref 74–106)
HCT VFR BLD CALC: 28.8 % (ref 36–48)
HGB BLD-MCNC: 9.4 G/DL (ref 12–16)
IMM GRANULOCYTES NFR BLD: 0.3 % (ref 0–5)
IRON SERPL-MCNC: 190 UG/DL (ref 35–150)
LYMPHOCYTES NFR BLD AUTO: 36 % (ref 15–50)
MAGNESIUM SERPL-MCNC: 1.2 MG/DL (ref 1.8–2.4)
MCH RBC QN AUTO: 25.2 PG (ref 26–34)
MCHC RBC AUTO-ENTMCNC: 32.6 G/DL (ref 31–37)
MCV RBC: 77.2 FL (ref 80–100)
MONOCYTES NFR BLD: 15.4 % (ref 2–11)
NEUTROPHILS NFR BLD AUTO: 45.9 % (ref 40–80)
OSMOLALITY SERPL CALC.SUM OF ELEC: 282 MOSM/KG (ref 275–300)
PLATELET # BLD: 129 10X3/UL (ref 130–400)
PMV BLD AUTO: 11.2 FL (ref 7.4–10.4)
POTASSIUM SERPL-SCNC: 4.1 MMOL/L (ref 3.5–5.1)
PROT SERPL-MCNC: 6.3 G/DL (ref 6.4–8.2)
RBC # BLD AUTO: 3.73 10X6/UL (ref 4–5.4)
SAO2 % BLD FROM PO2: 39 % (ref 15–55)
SODIUM SERPL-SCNC: 139 MMOL/L (ref 136–145)
TIBC SERPL-MCNC: 486 UG/DL (ref 260–445)
UIBC SERPL-MCNC: 296 UG/DL (ref 150–375)
WBC # BLD AUTO: 3.3 10X3/UL (ref 4.8–10.8)

## 2019-08-20 NOTE — MORECARE
CASE MANAGEMENT DISCHARGE SUMMARY
 
 
PATIENT: REESE GOMEZ                       UNIT: [id number]
ACCOUNT#: [id number]                       ADM DATE: 19
AGE: 69     : [birth year]  SEX: F            ROOM/BED: D.1206    
AUTHOR: LACI NEWBY                             PHYSICIAN:                               
 
REFERRING PHYSICIAN: HOSSEIN STEVENS DO            
DATE OF SERVICE: 19
Discharge Plan
 
 
Patient Name: REESE GOMEZ
Facility: [facility]
Encounter #: [id number]
Medical Record #: [id number]
: 1950
Planned Disposition: Home
Anticipated Discharge Date: 19
 
Discharge Date: 2019
Expected LOS: 1
Initial Reviewer: [email or web address]
Initial Review Date: 2019
Generated: 19  12:50 pm 
 DCPIA - Discharge Planning Initial Assessment
 
Updated by [id number]: Tasha You on 19  11:48 am
*  Is the patient Alert and Oriented?
Yes
*  How many steps to enter\exit or inside your home? One *  PCP Dr. Muniz *  Pharmacy
[facility]
*  Preadmission Environment
Home with Family
*  ADLs
Independent
*  Equipment
Bedside Commode
Cane
Rolling Walker
*  List name and contact numbers for known caregivers / representatives who 
currently or will assist patient after discharge:
[facility] - [phone]
*  Verbal permission to speak to the caregivers and representatives has been 
obtained from the patient.
Yes
*  Community resources currently utilized
None
*  Additional services required to return to the preadmission environment?
 
No
*  Can the patient safely return to the preadmission environment?
Yes
*  Has this patient been hospitalized within the prior 30 days at any 
hospital?
No
 
 
 
 
 
 
Patient Name: REESE GOMEZ
Encounter #: [id number]
Page [phone]
 
 
 
 
 
Electronically Signed by LACI NEWBY on 19 at 1150
 
 
 
 
 
 
**All edits/amendments must be made on the electronic document**
 
DICTATION DATE: 19 1150     : SHANTELLE  19 1150     
RPT#: [id number]                                DC DATE:19
                                               STATUS: DIS IN  
[facility]
[address] [place] [address]
***END OF REPORT***

## 2019-08-20 NOTE — MORECARE
CASE MANAGEMENT DISCHARGE SUMMARY
 
 
PATIENT: REESE GOMEZ                       UNIT: [id number]
ACCOUNT#: [id number]                       ADM DATE: 19
AGE: 69     : [birth year]  SEX: F            ROOM/BED: D.1206    
AUTHOR: LACI NEWBY                             PHYSICIAN:                               
 
REFERRING PHYSICIAN: HOSSEIN STEVENS DO            
DATE OF SERVICE: 19
Discharge Plan
 
 
Patient Name: REESE GOMEZ
Facility: [facility]:[place]
Encounter #: [id number]
Medical Record #: [id number]
: 1950
Planned Disposition: Home
Anticipated Discharge Date: 19
 
Discharge Date: 2019
Expected LOS: 1
Initial Reviewer: [email or web address]
Initial Review Date: 2019
Generated: 19  12:59 pm 
DCP- Discharge Planning
 
Updated by [email or web address]: Tasha You on 19  10:52 am CT
Patient Name: REESE GOMEZ                                     
Admission Status: ER   
Accout number: [id number]                              
Admission Date: 2019   
: 1950                                                        
Admission Diagnosis:   
Attending: HOSSEIN STEVENS                                                
Current LOS:  1   
  
Anticipated DC Date: 2019   
Planned Disposition: Home   
Primary Insurance: MEDICARE A & B   
  
  
Discharge Planning Comments:   
  
DC PLAN: Return home with her  independently.   
DC NEEDS: Denied dc needs at this time.  
  
CM met with patient to complete initial dc planning assessment.  CM educated 
patient on the CM role and verbal consent given by patient to complete 
 
assessment.  CM verified patient's address, phone number, and emergency 
contact phone numbers.  Patient lives at home with her .  At discharge 
patient plans to return home  and feels this is a safe discharge. Patient is 
discharging home today.   CM discussed availability of home health, rehab 
services, and medical equipment. Patient denied discharge needs at this time. 
Patient reports her   will transport her home at time of discharge. CM 
will continue to follow and will assist as needed with dc plans/needs.    
  
: Tasha You RN, [facility]
 DCPIA - Discharge Planning Initial Assessment
 
Updated by [email or web address]: Tasha You on 19  11:48 am
*  Is the patient Alert and Oriented?
Yes
*  How many steps to enter\exit or inside your home? One *  PCP Dr. Muniz *  Pharmacy
[facility]
*  Preadmission Environment
Home with Family
*  ADLs
Independent
*  Equipment
Bedside Commode
Cane
Rolling Walker
*  List name and contact numbers for known caregivers / representatives who 
currently or will assist patient after discharge:
[facility] - [phone]
*  Verbal permission to speak to the caregivers and representatives has been 
obtained from the patient.
Yes
*  Community resources currently utilized
None
*  Additional services required to return to the preadmission environment?
No
*  Can the patient safely return to the preadmission environment?
Yes
*  Has this patient been hospitalized within the prior 30 days at any 
hospital?
No
 
 
 
 
 
 
 
Last DP export: 19  10:50 a
Patient Name: REESE GOMEZ
 
Encounter #: [id number]
Page [phone]
 
 
 
 
 
Electronically Signed by LACI NEWBY on 19 at 1159
 
 
 
 
 
 
**All edits/amendments must be made on the electronic document**
 
DICTATION DATE: 19 1159     : SHANTELLE  19 1159     
RPT#: [id number]                                DC DATE:19
                                               STATUS: DIS IN  
[facility]
[address] [place] [address]
***END OF REPORT***

## 2019-11-15 ENCOUNTER — HOSPITAL ENCOUNTER (OUTPATIENT)
Dept: HOSPITAL 84 - D.HCCECHO | Age: 69
Discharge: HOME | End: 2019-11-15
Attending: INTERNAL MEDICINE
Payer: MEDICARE

## 2019-11-15 VITALS — BODY MASS INDEX: 29.8 KG/M2

## 2019-11-15 DIAGNOSIS — I08.1: Primary | ICD-10-CM

## 2019-11-19 NOTE — EC
PATIENT:REESE GOMEZ                   DATE OF SERVICE: 11/15/19
SEX: F                                  MEDICAL RECORD: [id number]
DATE OF BIRTH: [birth year]                        [facility]          
AGE OF PATIENT: 69                             ADMISSION DATE: 11/15/19
 
REFERRING PHYSICIAN:                               
 
INTERPRETING PHYSICIAN: BENEDICTO BALLARD MD          
 
 
 
                             ECHOCARDIOGRAM REPORT
  ECHO CHARGES 4               ECHO COMPLETE                 Date: 11/15/19
 
 
 
CLINICAL DIAGNOSIS: FATIGUE                       
                    H/O HTN/CAD                   
                         ECHOCARDIOGRAPHIC MEASUREMENTS
      (adult normal given)
   AC root (d.<3.7cm) 3.0  cm   LV Septum d (<1.2 cm> 1.1  cm
      Valve Excursion 1.5  cm     LV Septum (systole) 1.5  cm
Left Atria (s.<4.0cm> 4.6  cm          LVPW d(<1.2cm) 1.0  cm
        RV (d.<2.3cm) 2.7  cm           LVPW (sytole) 1.9  cm
  LV diastole(<5.6CM) 5.8  cm       MV E-F(>70mm/sec)      cm
           LV systole 3.2  cm           LVOT Diameter 2.0  cm
       MV exc.(>10mm)      cm
Est.ejection fraction (50-75%)     %
 
   DOPPLER:
     LVIT      cm/sec A 50.0 cm/sec E 120   cm/sec
       LA      cm/sec      RVSP 46.0 mmHg
     LVOT 72.0 cm/sec   AOP1/2T      m/s
  Asc. Ao 161  cm/sec
     RVOT 59.0 cm/sec
       RA      cm/sec
       PA 91.0 cm/sec
 AV Gradient Peak 10.4 mmHg  AV Mean 5.5  mmHg  AV Area 1.3  cm
 MV Gradient Peak 7.3  mmHg  MV Mean 2.2  mmHg  MV Area      cm
   COMMENTS: OP - HC                                      
 
 
 Cardiac Sonographer: 1               VINOD CHRAY            
      Cardiologist: 3          Dr. Vegas             
             TAPE# PACS           
                                       Pericardial Effusion N                        
 
 
DATE OF SERVICE:  
 
Adequate 2D, color flow imaging, spectral Doppler, and M-Mode 
 
No LVH.  LV internal dimensions are normal.  Wall motion is normal.  EF is
greater than or equal to 55%.  Aortic valve is sclerotic.  There is no evidence
of stenosis by Doppler interrogation.  Left atrium is dilated at 4.6 cm.  Mitral
valve shows no prolapse.  Mild MR.  Right-sided chambers are grossly normal. 
Mild-to-moderate TR.
 
 
 
 
ECHOCARDIOGRAM REPORT                          [id number]    REESE GOMEZ           
 
 
TRANSINT:[id number] Voice Confirmation ID: [id number] DOCUMENT ID: [id number]
                                           
                                           BENEDICTO BALLARD MD          
 
 
 
Electronically Signed by BENEDICTO BALLARD on 11/19/19 at 1443
 
 
 
 
 
 
 
 
 
 
 
 
 
 
 
 
 
 
 
 
 
 
 
 
 
 
 
 
 
 
 
 
 
 
 
 
 
 
 
CC:                                                             [id number]
DICTATION DATE: 11/19/19 1304     :     11/19/19 1353      DEP CLI 
                                                                      11/15/19
[facility]                                          
[address] [id number]

## 2019-12-10 ENCOUNTER — HOSPITAL ENCOUNTER (OUTPATIENT)
Dept: HOSPITAL 84 - D.LAB | Age: 69
Discharge: HOME | End: 2019-12-10
Attending: INTERNAL MEDICINE
Payer: MEDICARE

## 2019-12-10 VITALS — BODY MASS INDEX: 29.8 KG/M2

## 2019-12-10 DIAGNOSIS — K74.60: Primary | ICD-10-CM

## 2019-12-10 LAB
ALBUMIN SERPL-MCNC: 3 G/DL (ref 3.4–5)
ALP SERPL-CCNC: 105 U/L (ref 46–116)
ALT SERPL-CCNC: 30 U/L (ref 10–68)
BASOPHILS NFR BLD AUTO: 0.3 % (ref 0–2)
BILIRUB DIRECT SERPL-MCNC: 0.14 MG/DL (ref 0–0.3)
BILIRUB INDIRECT SERPL-MCNC: 0.26 MG/DL (ref 0–1)
BILIRUB SERPL-MCNC: 0.4 MG/DL (ref 0.2–1.3)
EOSINOPHIL NFR BLD: 2.1 % (ref 0–7)
ERYTHROCYTE [DISTWIDTH] IN BLOOD BY AUTOMATED COUNT: 14 % (ref 11.5–14.5)
GLOBULIN SER-MCNC: 3.1 G/L
HCT VFR BLD CALC: 29.3 % (ref 36–48)
HGB BLD-MCNC: 9.5 G/DL (ref 12–16)
IMM GRANULOCYTES NFR BLD: 0 % (ref 0–5)
INR PPP: 1.18 (ref 0.85–1.17)
LYMPHOCYTES NFR BLD AUTO: 37.1 % (ref 15–50)
MCH RBC QN AUTO: 29 PG (ref 26–34)
MCHC RBC AUTO-ENTMCNC: 32.4 G/DL (ref 31–37)
MCV RBC: 89.3 FL (ref 80–100)
MONOCYTES NFR BLD: 12.2 % (ref 2–11)
NEUTROPHILS NFR BLD AUTO: 48.3 % (ref 40–80)
PLATELET # BLD: 132 10X3/UL (ref 130–400)
PMV BLD AUTO: 10.6 FL (ref 7.4–10.4)
PROT SERPL-MCNC: 6.1 G/DL (ref 6.4–8.2)
PROTHROMBIN TIME: 14.5 SECONDS (ref 11.6–15)
RBC # BLD AUTO: 3.28 10X6/UL (ref 4–5.4)
WBC # BLD AUTO: 3.4 10X3/UL (ref 4.8–10.8)

## 2019-12-23 ENCOUNTER — HOSPITAL ENCOUNTER (OUTPATIENT)
Dept: HOSPITAL 84 - D.OPS | Age: 69
Discharge: HOME | End: 2019-12-23
Attending: INTERNAL MEDICINE
Payer: MEDICARE

## 2019-12-23 VITALS — SYSTOLIC BLOOD PRESSURE: 118 MMHG | DIASTOLIC BLOOD PRESSURE: 63 MMHG

## 2019-12-23 VITALS — HEIGHT: 67 IN | WEIGHT: 195.41 LBS | BODY MASS INDEX: 30.67 KG/M2

## 2019-12-23 DIAGNOSIS — E72.20: ICD-10-CM

## 2019-12-23 DIAGNOSIS — K74.60: ICD-10-CM

## 2019-12-23 DIAGNOSIS — R18.8: ICD-10-CM

## 2019-12-23 DIAGNOSIS — K76.6: ICD-10-CM

## 2019-12-23 DIAGNOSIS — R12: ICD-10-CM

## 2019-12-23 DIAGNOSIS — K21.9: ICD-10-CM

## 2019-12-23 DIAGNOSIS — I85.00: Primary | ICD-10-CM

## 2019-12-23 DIAGNOSIS — I86.4: ICD-10-CM

## 2019-12-23 LAB
ALBUMIN SERPL-MCNC: 3.2 G/DL (ref 3.4–5)
ALP SERPL-CCNC: 105 U/L (ref 46–116)
ALT SERPL-CCNC: 33 U/L (ref 10–68)
ANION GAP SERPL CALC-SCNC: 15.3 MMOL/L (ref 8–16)
APTT BLD: 29.4 SECONDS (ref 22.8–39.4)
BASOPHILS NFR BLD AUTO: 0.3 % (ref 0–2)
BILIRUB SERPL-MCNC: 0.5 MG/DL (ref 0.2–1.3)
BUN SERPL-MCNC: 15 MG/DL (ref 7–18)
CALCIUM SERPL-MCNC: 8.4 MG/DL (ref 8.5–10.1)
CHLORIDE SERPL-SCNC: 104 MMOL/L (ref 98–107)
CO2 SERPL-SCNC: 25.5 MMOL/L (ref 21–32)
CREAT SERPL-MCNC: 1.2 MG/DL (ref 0.6–1.3)
EOSINOPHIL NFR BLD: 2.1 % (ref 0–7)
ERYTHROCYTE [DISTWIDTH] IN BLOOD BY AUTOMATED COUNT: 13.8 % (ref 11.5–14.5)
GLOBULIN SER-MCNC: 3.6 G/L
GLUCOSE SERPL-MCNC: 184 MG/DL (ref 74–106)
HCT VFR BLD CALC: 28.2 % (ref 36–48)
HGB BLD-MCNC: 9.1 G/DL (ref 12–16)
IMM GRANULOCYTES NFR BLD: 0.3 % (ref 0–5)
INR PPP: 1.17 (ref 0.85–1.17)
LYMPHOCYTES NFR BLD AUTO: 33.4 % (ref 15–50)
MCH RBC QN AUTO: 28.5 PG (ref 26–34)
MCHC RBC AUTO-ENTMCNC: 32.3 G/DL (ref 31–37)
MCV RBC: 88.4 FL (ref 80–100)
MONOCYTES NFR BLD: 12.6 % (ref 2–11)
NEUTROPHILS NFR BLD AUTO: 51.3 % (ref 40–80)
OSMOLALITY SERPL CALC.SUM OF ELEC: 286 MOSM/KG (ref 275–300)
PLATELET # BLD: 120 10X3/UL (ref 130–400)
PMV BLD AUTO: 11.2 FL (ref 7.4–10.4)
POTASSIUM SERPL-SCNC: 3.8 MMOL/L (ref 3.5–5.1)
PROT SERPL-MCNC: 6.8 G/DL (ref 6.4–8.2)
PROTHROMBIN TIME: 14.3 SECONDS (ref 11.6–15)
RBC # BLD AUTO: 3.19 10X6/UL (ref 4–5.4)
SODIUM SERPL-SCNC: 141 MMOL/L (ref 136–145)
WBC # BLD AUTO: 3.3 10X3/UL (ref 4.8–10.8)

## 2019-12-23 NOTE — OP
PATIENT NAME:  REESE GOMEZ                             MEDICAL RECORD: [id number]
:46                                             LOCATION:D.OPS          
                                                         ADMISSION DATE:        
SURGEON:  DEVAN CALDERON DO             
 
 
DATE OF OPERATION:  2019
 
PROCEDURE:  EGD with variceal band ligation and biopsies.
 
INDICATIONS FOR PROCEDURE:  History of esophageal varices, ascites, heartburn,
GERD, hyperammonemia.
 
SCOPE:  Olympus video gastroscope.
 
MEDICATIONS:  Propofol 150 mg IV per anesthesia.
 
ESTIMATED BLOOD LOSS:  Minimal.
 
COMPLICATIONS:  None.
 
FINDINGS:  Informed consent was given.  The patient was made comfortable with
the above medication.  After reaching an adequate level of sedation by slow IV
push, the patient was placed on her left side.  The endoscope was advanced under
direct visualization through the mouth to the second portion of the duodenum
with ease.  In the esophagus, there was evidence of grade II to grade III
esophageal varices located in the mid to distal esophagus.  It was really just
one column that required banding.  Two bands were placed using the [employer] speed .  At the GE junction, the squamocolumnar junction
appeared normal.  The endoscope was advanced beyond the GE junction into the
stomach and retroflexed to view the cardia and fundus.  There were no obvious
esophageal varices.  There was no hiatal hernia.  Throughout the stomach, there
was evidence of mild portal hypertensive gastropathy.  There was some mild
gastritis in the antrum and prepyloric region characterized by erythema,
granularity, and friability.  Cold forceps biopsies were taken to submit for
histopathology and to rule out the presence of H. pylori.  The endoscope was
advanced beyond the pylorus into the duodenum, which appeared normal to the
second portion.  The endoscope was then withdrawn from the patient.  The patient
tolerated the procedure well and there were no complications.
 
IMPRESSION:
1.  Grade II to grade III esophageal varices, banding performed times 2.
2.  Mild portal hypertensive gastropathy.
3.  Mild gastritis of the antrum.
 
PLAN AND RECOMMENDATIONS:
1.  Discharge home when recovery parameters are met.
2.  Follow up biopsy specimen results.
3.  GERD diet and reflux precautions as well as a low sodium diet regarding the
patient's cirrhosis.
4.  Continue current medications including omeprazole 20 mg daily.
5.  Repeat EGD in 4-6 weeks for further banding as indicated.
 
TRANSINT:[id number] Voice Confirmation ID: [id number] DOCUMENT ID: [id number]
                                           
 
 
 
OPERATIVE REPORT                               [id number]    REESE GOMEZ NATHAN A DO             
 
 
 
 
 
 
 
 
 
 
 
 
 
 
 
 
 
 
 
 
 
 
 
 
 
 
 
 
 
 
 
 
 
 
 
 
 
 
 
 
 
 
 
 
 
 
CC:                                                             [id number]
DICTATION DATE: 19     :     19 1229      [facility] 
                                                                      19
[place] [address] [place] [id number]

## 2020-01-30 ENCOUNTER — HOSPITAL ENCOUNTER (OUTPATIENT)
Dept: HOSPITAL 84 - D.OPS | Age: 70
Discharge: HOME | End: 2020-01-30
Attending: INTERNAL MEDICINE
Payer: MEDICARE

## 2020-01-30 VITALS — DIASTOLIC BLOOD PRESSURE: 35 MMHG | SYSTOLIC BLOOD PRESSURE: 120 MMHG

## 2020-01-30 VITALS — BODY MASS INDEX: 30.35 KG/M2 | HEIGHT: 67 IN | WEIGHT: 193.4 LBS

## 2020-01-30 DIAGNOSIS — D64.9: Primary | ICD-10-CM

## 2020-06-02 ENCOUNTER — HOSPITAL ENCOUNTER (OUTPATIENT)
Dept: HOSPITAL 84 - D.LAB | Age: 70
Discharge: HOME | End: 2020-06-02
Attending: INTERNAL MEDICINE
Payer: MEDICARE

## 2020-06-02 VITALS — BODY MASS INDEX: 30.4 KG/M2

## 2020-06-02 DIAGNOSIS — K90.49: ICD-10-CM

## 2020-06-02 DIAGNOSIS — D51.9: ICD-10-CM

## 2020-06-02 DIAGNOSIS — D50.9: ICD-10-CM

## 2020-06-02 DIAGNOSIS — C53.8: Primary | ICD-10-CM

## 2020-06-02 DIAGNOSIS — D50.0: ICD-10-CM

## 2020-06-02 DIAGNOSIS — D64.9: ICD-10-CM

## 2020-06-02 DIAGNOSIS — D64.81: ICD-10-CM

## 2020-06-02 LAB
ALBUMIN SERPL-MCNC: 3.5 G/DL (ref 3.4–5)
ALP SERPL-CCNC: 82 U/L (ref 30–120)
ALT SERPL-CCNC: 40 U/L (ref 10–68)
ANION GAP SERPL CALC-SCNC: 18.1 MMOL/L (ref 8–16)
BASOPHILS NFR BLD AUTO: 0.3 % (ref 0–2)
BILIRUB SERPL-MCNC: 0.54 MG/DL (ref 0.2–1.3)
BUN SERPL-MCNC: 20 MG/DL (ref 7–18)
CALCIUM SERPL-MCNC: 9 MG/DL (ref 8.5–10.1)
CHLORIDE SERPL-SCNC: 99 MMOL/L (ref 98–107)
CO2 SERPL-SCNC: 21 MMOL/L (ref 21–32)
CREAT SERPL-MCNC: 1.6 MG/DL (ref 0.6–1.3)
EOSINOPHIL NFR BLD: 2.2 % (ref 0–7)
ERYTHROCYTE [DISTWIDTH] IN BLOOD BY AUTOMATED COUNT: 14.3 % (ref 11.5–14.5)
GLOBULIN SER-MCNC: 3.1 G/L
GLUCOSE SERPL-MCNC: 242 MG/DL (ref 74–106)
HCT VFR BLD CALC: 27 % (ref 36–48)
HGB BLD-MCNC: 8.6 G/DL (ref 12–16)
IMM GRANULOCYTES NFR BLD: 0.3 % (ref 0–5)
LYMPHOCYTES NFR BLD AUTO: 34.3 % (ref 15–50)
MCH RBC QN AUTO: 29.5 PG (ref 26–34)
MCHC RBC AUTO-ENTMCNC: 31.9 G/DL (ref 31–37)
MCV RBC: 92.5 FL (ref 80–100)
MONOCYTES NFR BLD: 13 % (ref 2–11)
NEUTROPHILS NFR BLD AUTO: 49.9 % (ref 40–80)
OSMOLALITY SERPL CALC.SUM OF ELEC: 276 MOSM/KG (ref 275–300)
PLATELET # BLD: 137 10X3/UL (ref 130–400)
PMV BLD AUTO: 10.5 FL (ref 7.4–10.4)
POTASSIUM SERPL-SCNC: 5.1 MMOL/L (ref 3.5–5.1)
PROT SERPL-MCNC: 6.6 G/DL (ref 6.4–8.2)
RBC # BLD AUTO: 2.92 10X6/UL (ref 4–5.4)
SODIUM SERPL-SCNC: 133 MMOL/L (ref 136–145)
WBC # BLD AUTO: 3.2 10X3/UL (ref 4.8–10.8)

## 2020-07-08 ENCOUNTER — HOSPITAL ENCOUNTER (OUTPATIENT)
Dept: HOSPITAL 84 - D.OPS | Age: 70
Discharge: HOME | End: 2020-07-08
Attending: INTERNAL MEDICINE
Payer: MEDICARE

## 2020-07-08 VITALS — BODY MASS INDEX: 29.88 KG/M2 | WEIGHT: 190.4 LBS | BODY MASS INDEX: 29.88 KG/M2 | HEIGHT: 67 IN

## 2020-07-08 VITALS — DIASTOLIC BLOOD PRESSURE: 49 MMHG | SYSTOLIC BLOOD PRESSURE: 144 MMHG

## 2020-07-08 DIAGNOSIS — D64.9: Primary | ICD-10-CM

## 2020-07-08 NOTE — NUR
1940 NO PROBLEMS WITH BLOOD, IV DC'D WITH CATH INTACT  DC INSTS
GIVEN, VOICED UNDERSTANDING, UP TO BR VOIDS QS, RELEASED IN WC.

## 2020-07-08 NOTE — NUR
NOTIFIED BABATUNDE MALDONADO IN INFECTION CONTROL ABOUT PREVIOUS OVERNIGHT STAY FOR
BLOOD.  NOT CONSIDERED HIGH RISK.  NO ACTIONS NEEDED.

## 2020-07-08 NOTE — NUR
1605 1ST UNIT OF BLOOD CHECKED AT BEDSIDE BY THIS NURSE AND ARTEMIO FINNEGAN RN. INITIATED AT 50/CC/HR.
1620 DENIES PROBLEMS WITH BLOOD, RATE INCREASED /CC/HR..
1635 ASSISSTED UP TO BR VOIDS LG AMT.
1650 DENIES PROBLEMS WITH BLOOD. IV SITE GOOD. CALL LIGHT AT SIDE.
WATCHING TV.

## 2020-07-08 NOTE — NUR
1705 BLOOD INFUSING WELL, JUST OVER HALF COMPLETED FIRST UNIT, DENIES
PROBLEMS. IV SITE GOOD. WATCHING TV.

## 2020-07-08 NOTE — NUR
1745 1ST UNIT BLOOD HAS COMPLETED, LINE FLUSHED WITH NS.
1750 2ND UNIT BLOOD CHECKED AT BEDSIDE BY THIS NURSE AND ARTEMIO FINNEGAN RN, INITIATED AT 50/CC/HR. WATER SERVED.

## 2020-08-21 ENCOUNTER — HOSPITAL ENCOUNTER (OUTPATIENT)
Dept: HOSPITAL 84 - D.LAB | Age: 70
Discharge: HOME | End: 2020-08-21
Attending: INTERNAL MEDICINE
Payer: MEDICARE

## 2020-08-21 VITALS — BODY MASS INDEX: 29.8 KG/M2

## 2020-08-21 DIAGNOSIS — K74.60: Primary | ICD-10-CM

## 2020-08-21 LAB
ALBUMIN SERPL-MCNC: 3.1 G/DL (ref 3.4–5)
ALP SERPL-CCNC: 83 U/L (ref 30–120)
ALT SERPL-CCNC: 33 U/L (ref 10–68)
BILIRUB DIRECT SERPL-MCNC: 0.17 MG/DL (ref 0–0.3)
BILIRUB INDIRECT SERPL-MCNC: 0.36 MG/DL (ref 0–1)
BILIRUB SERPL-MCNC: 0.53 MG/DL (ref 0.2–1.3)
EOSINOPHIL NFR BLD: 1 % (ref 0–7)
ERYTHROCYTE [DISTWIDTH] IN BLOOD BY AUTOMATED COUNT: 21.3 % (ref 11.5–14.5)
GLOBULIN SER-MCNC: 3.1 G/L
HCT VFR BLD CALC: 24.6 % (ref 36–48)
HGB BLD-MCNC: 8.1 G/DL (ref 12–16)
INR PPP: 1.09 (ref 0.85–1.17)
LYMPHOCYTES NFR BLD AUTO: 30 % (ref 15–50)
MCH RBC QN AUTO: 30.1 PG (ref 26–34)
MCHC RBC AUTO-ENTMCNC: 32.9 G/DL (ref 31–37)
MCV RBC: 91.4 FL (ref 80–100)
MONOCYTES NFR BLD: 15 % (ref 2–11)
NEUTROPHILS NFR BLD AUTO: 53 % (ref 40–80)
PLATELET # BLD EST: NORMAL 10*3/UL
PLATELET # BLD: 129 10X3/UL (ref 130–400)
PMV BLD AUTO: 10.3 FL (ref 7.4–10.4)
PROT SERPL-MCNC: 6.2 G/DL (ref 6.4–8.2)
PROTHROMBIN TIME: 14 SECONDS (ref 11.6–15)
RBC # BLD AUTO: 2.69 10X6/UL (ref 4–5.4)
WBC # BLD AUTO: 2.7 10X3/UL (ref 4.8–10.8)

## 2021-02-23 NOTE — NUR
CONFIRMED WITH PT TIME OF ARRIVAL, NPO STATUS, STOPPED THINNER LAST THURSDAY,
PT HAS . PT VERBALIZED UNDERSTANDING.

## 2021-02-24 ENCOUNTER — HOSPITAL ENCOUNTER (OUTPATIENT)
Dept: HOSPITAL 84 - D.SP | Age: 71
Discharge: HOME | End: 2021-02-24
Attending: INTERNAL MEDICINE
Payer: MEDICARE

## 2021-02-24 VITALS — BODY MASS INDEX: 30.6 KG/M2 | WEIGHT: 190.4 LBS | HEIGHT: 66 IN

## 2021-02-24 VITALS — DIASTOLIC BLOOD PRESSURE: 48 MMHG | SYSTOLIC BLOOD PRESSURE: 130 MMHG

## 2021-02-24 DIAGNOSIS — E07.9: ICD-10-CM

## 2021-02-24 DIAGNOSIS — E11.9: ICD-10-CM

## 2021-02-24 DIAGNOSIS — I10: ICD-10-CM

## 2021-02-24 DIAGNOSIS — R18.8: Primary | ICD-10-CM

## 2021-02-24 LAB
ALBUMIN SERPL-MCNC: 2.7 G/DL (ref 3.4–5)
ALP SERPL-CCNC: 84 U/L (ref 30–120)
ALT SERPL-CCNC: 18 U/L (ref 10–68)
ANION GAP SERPL CALC-SCNC: 15.7 MMOL/L (ref 8–16)
APTT BLD: 29.9 SECONDS (ref 22.8–39.4)
BASOPHILS NFR BLD AUTO: 0.2 % (ref 0–2)
BILIRUB SERPL-MCNC: 0.45 MG/DL (ref 0.2–1.3)
BUN SERPL-MCNC: 22 MG/DL (ref 7–18)
CALCIUM SERPL-MCNC: 8.6 MG/DL (ref 8.5–10.1)
CHLORIDE SERPL-SCNC: 103 MMOL/L (ref 98–107)
CO2 SERPL-SCNC: 21.6 MMOL/L (ref 21–32)
CREAT SERPL-MCNC: 1.7 MG/DL (ref 0.6–1.3)
EOSINOPHIL NFR BLD: 2.8 % (ref 0–7)
ERYTHROCYTE [DISTWIDTH] IN BLOOD BY AUTOMATED COUNT: 16.7 % (ref 11.5–14.5)
GLOBULIN SER-MCNC: 3.3 G/L
GLUCOSE SERPL-MCNC: 84 MG/DL (ref 74–106)
HCT VFR BLD CALC: 22.9 % (ref 36–48)
HGB BLD-MCNC: 7.1 G/DL (ref 12–16)
IMM GRANULOCYTES NFR BLD: 0.2 % (ref 0–5)
INR PPP: 1.29 (ref 0.85–1.17)
LYMPHOCYTES # BLD: 1.31 10X3/UL (ref 1.18–3.74)
LYMPHOCYTES NFR BLD AUTO: 24.7 % (ref 15–50)
MCH RBC QN AUTO: 27.4 PG (ref 26–34)
MCHC RBC AUTO-ENTMCNC: 31 G/DL (ref 31–37)
MCV RBC: 88.4 FL (ref 80–100)
MONOCYTES NFR BLD: 12.3 % (ref 2–11)
NEUTROPHILS # BLD: 3.17 10X3/UL (ref 1.56–6.13)
NEUTROPHILS NFR BLD AUTO: 59.8 % (ref 40–80)
OSMOLALITY SERPL CALC.SUM OF ELEC: 273 MOSM/KG (ref 275–300)
PLATELET # BLD: 212 10X3/UL (ref 130–400)
PMV BLD AUTO: 10 FL (ref 7.4–10.4)
POTASSIUM SERPL-SCNC: 4.3 MMOL/L (ref 3.5–5.1)
PROT SERPL-MCNC: 6 G/DL (ref 6.4–8.2)
PROTHROMBIN TIME: 14.9 SECONDS (ref 11.6–15)
RBC # BLD AUTO: 2.59 10X6/UL (ref 4–5.4)
SODIUM SERPL-SCNC: 136 MMOL/L (ref 136–145)
WBC # BLD AUTO: 5.3 10X3/UL (ref 4.8–10.8)

## 2021-04-08 ENCOUNTER — HOSPITAL ENCOUNTER (OUTPATIENT)
Dept: HOSPITAL 84 - D.ER | Age: 71
Setting detail: OBSERVATION
LOS: 1 days | Discharge: HOME | End: 2021-04-09
Attending: FAMILY MEDICINE | Admitting: FAMILY MEDICINE
Payer: MEDICARE

## 2021-04-08 VITALS — DIASTOLIC BLOOD PRESSURE: 53 MMHG | SYSTOLIC BLOOD PRESSURE: 124 MMHG

## 2021-04-08 VITALS — HEIGHT: 66 IN | BODY MASS INDEX: 30.6 KG/M2 | WEIGHT: 190.4 LBS | BODY MASS INDEX: 30.6 KG/M2

## 2021-04-08 VITALS — SYSTOLIC BLOOD PRESSURE: 108 MMHG | DIASTOLIC BLOOD PRESSURE: 46 MMHG

## 2021-04-08 VITALS — DIASTOLIC BLOOD PRESSURE: 52 MMHG | SYSTOLIC BLOOD PRESSURE: 109 MMHG

## 2021-04-08 VITALS — SYSTOLIC BLOOD PRESSURE: 134 MMHG | DIASTOLIC BLOOD PRESSURE: 47 MMHG

## 2021-04-08 VITALS — DIASTOLIC BLOOD PRESSURE: 81 MMHG | SYSTOLIC BLOOD PRESSURE: 125 MMHG

## 2021-04-08 DIAGNOSIS — I25.10: ICD-10-CM

## 2021-04-08 DIAGNOSIS — K74.60: ICD-10-CM

## 2021-04-08 DIAGNOSIS — E11.40: ICD-10-CM

## 2021-04-08 DIAGNOSIS — E87.1: ICD-10-CM

## 2021-04-08 DIAGNOSIS — R18.8: Primary | ICD-10-CM

## 2021-04-08 DIAGNOSIS — M79.7: ICD-10-CM

## 2021-04-08 DIAGNOSIS — Z79.84: ICD-10-CM

## 2021-04-08 DIAGNOSIS — E03.9: ICD-10-CM

## 2021-04-08 DIAGNOSIS — R14.0: ICD-10-CM

## 2021-04-08 DIAGNOSIS — I10: ICD-10-CM

## 2021-04-08 DIAGNOSIS — G25.81: ICD-10-CM

## 2021-04-08 DIAGNOSIS — K59.00: ICD-10-CM

## 2021-04-08 DIAGNOSIS — D61.818: ICD-10-CM

## 2021-04-08 DIAGNOSIS — K75.81: ICD-10-CM

## 2021-04-08 DIAGNOSIS — K21.9: ICD-10-CM

## 2021-04-08 LAB
ALBUMIN SERPL-MCNC: 2.4 G/DL (ref 3.4–5)
ALP SERPL-CCNC: 81 U/L (ref 30–120)
ALT SERPL-CCNC: 16 U/L (ref 10–68)
AMYLASE SERPL-CCNC: 15 U/L (ref 25–115)
ANION GAP SERPL CALC-SCNC: 13.6 MMOL/L (ref 8–16)
APTT BLD: 38 SECONDS (ref 22.8–39.4)
BASOPHILS NFR BLD AUTO: 0.2 % (ref 0–2)
BILIRUB SERPL-MCNC: 0.94 MG/DL (ref 0.2–1.3)
BUN SERPL-MCNC: 33 MG/DL (ref 7–18)
CALCIUM SERPL-MCNC: 8.1 MG/DL (ref 8.5–10.1)
CHLORIDE SERPL-SCNC: 100 MMOL/L (ref 98–107)
CO2 SERPL-SCNC: 23.9 MMOL/L (ref 21–32)
CREAT SERPL-MCNC: 1.9 MG/DL (ref 0.6–1.3)
EOSINOPHIL NFR BLD: 0 % (ref 0–7)
ERYTHROCYTE [DISTWIDTH] IN BLOOD BY AUTOMATED COUNT: 17 % (ref 11.5–14.5)
GLOBULIN SER-MCNC: 2.9 G/L
GLUCOSE SERPL-MCNC: 64 MG/DL (ref 74–106)
HCT VFR BLD CALC: 27.9 % (ref 36–48)
HGB BLD-MCNC: 8.9 G/DL (ref 12–16)
IMM GRANULOCYTES NFR BLD: 0.2 % (ref 0–5)
INR PPP: 1.55 (ref 0.85–1.17)
LIPASE SERPL-CCNC: 57 U/L (ref 73–393)
LYMPHOCYTES # BLD: 0.53 10X3/UL (ref 1.18–3.74)
LYMPHOCYTES NFR BLD AUTO: 11.7 % (ref 15–50)
MCH RBC QN AUTO: 29.6 PG (ref 26–34)
MCHC RBC AUTO-ENTMCNC: 31.9 G/DL (ref 31–37)
MCV RBC: 92.7 FL (ref 80–100)
MONOCYTES NFR BLD: 10.8 % (ref 2–11)
NEUTROPHILS # BLD: 3.5 10X3/UL (ref 1.56–6.13)
NEUTROPHILS NFR BLD AUTO: 77.1 % (ref 40–80)
NT-PROBNP SERPL-MCNC: 1608 PG/ML (ref 0–125)
OSMOLALITY SERPL CALC.SUM OF ELEC: 272 MOSM/KG (ref 275–300)
PLATELET # BLD: 121 10X3/UL (ref 130–400)
PMV BLD AUTO: 11.6 FL (ref 7.4–10.4)
POTASSIUM SERPL-SCNC: 3.5 MMOL/L (ref 3.5–5.1)
PROT SERPL-MCNC: 5.3 G/DL (ref 6.4–8.2)
PROTHROMBIN TIME: 17.2 SECONDS (ref 11.6–15)
RBC # BLD AUTO: 3.01 10X6/UL (ref 4–5.4)
SODIUM SERPL-SCNC: 134 MMOL/L (ref 136–145)
TROPONIN I SERPL-MCNC: < 0.017 NG/ML (ref 0–0.06)
WBC # BLD AUTO: 4.5 10X3/UL (ref 4.8–10.8)

## 2021-04-08 NOTE — NUR
RECIEVED PT FROM ER VIA STRETCHER.  AT BEDSIDE. PT AWAKE. PURWICK IN
PLACE. BED LOW POSITION, CALL LIGHT IN REACH. WILL CONTINUE TO MONITOR.

## 2021-04-08 NOTE — NUR
CHECKED BLOOD SUGAR. RESULT WAS 40. ADMINISTERED THE DEXTROSE SYRINGE.
RECHECKED AND RESULTED . LEFT UNIT VIA BED TO PROCEDURE.

## 2021-04-09 VITALS — DIASTOLIC BLOOD PRESSURE: 93 MMHG | SYSTOLIC BLOOD PRESSURE: 135 MMHG

## 2021-04-09 VITALS — SYSTOLIC BLOOD PRESSURE: 112 MMHG | DIASTOLIC BLOOD PRESSURE: 44 MMHG

## 2021-04-09 VITALS — DIASTOLIC BLOOD PRESSURE: 49 MMHG | SYSTOLIC BLOOD PRESSURE: 133 MMHG

## 2021-04-09 VITALS — DIASTOLIC BLOOD PRESSURE: 56 MMHG | SYSTOLIC BLOOD PRESSURE: 98 MMHG

## 2021-04-09 VITALS — DIASTOLIC BLOOD PRESSURE: 41 MMHG | SYSTOLIC BLOOD PRESSURE: 115 MMHG

## 2021-04-09 VITALS — SYSTOLIC BLOOD PRESSURE: 110 MMHG | DIASTOLIC BLOOD PRESSURE: 52 MMHG

## 2021-04-09 LAB
ANION GAP SERPL CALC-SCNC: 13.7 MMOL/L (ref 8–16)
BACTERIA #/AREA URNS HPF: (no result) HPF
BASOPHILS NFR BLD AUTO: 0 % (ref 0–2)
BILIRUB SERPL-MCNC: NEGATIVE MG/DL
BUN SERPL-MCNC: 30 MG/DL (ref 7–18)
CALCIUM SERPL-MCNC: 8.4 MG/DL (ref 8.5–10.1)
CHLORIDE SERPL-SCNC: 103 MMOL/L (ref 98–107)
CO2 SERPL-SCNC: 24 MMOL/L (ref 21–32)
CREAT SERPL-MCNC: 1.6 MG/DL (ref 0.6–1.3)
EOSINOPHIL NFR BLD: 0.4 % (ref 0–7)
ERYTHROCYTE [DISTWIDTH] IN BLOOD BY AUTOMATED COUNT: 16.6 % (ref 11.5–14.5)
GLUCOSE SERPL-MCNC: 62 MG/DL (ref 74–106)
HCT VFR BLD CALC: 23.2 % (ref 36–48)
HCT VFR BLD CALC: 32.6 % (ref 36–48)
HGB BLD-MCNC: 10.5 G/DL (ref 12–16)
HGB BLD-MCNC: 7.5 G/DL (ref 12–16)
IMM GRANULOCYTES NFR BLD: 0.4 % (ref 0–5)
KETONES UR STRIP-MCNC: NEGATIVE MG/DL
LYMPHOCYTES # BLD: 0.74 10X3/UL (ref 1.18–3.74)
LYMPHOCYTES NFR BLD AUTO: 32 % (ref 15–50)
MAGNESIUM SERPL-MCNC: 1.6 MG/DL (ref 1.8–2.4)
MCH RBC QN AUTO: 29.5 PG (ref 26–34)
MCHC RBC AUTO-ENTMCNC: 32.3 G/DL (ref 31–37)
MCV RBC: 91.3 FL (ref 80–100)
MONOCYTES NFR BLD: 10.8 % (ref 2–11)
NEUTROPHILS # BLD: 1.3 10X3/UL (ref 1.56–6.13)
NEUTROPHILS NFR BLD AUTO: 56.4 % (ref 40–80)
NITRITE UR-MCNC: NEGATIVE MG/ML
OSMOLALITY SERPL CALC.SUM OF ELEC: 277 MOSM/KG (ref 275–300)
PH UR STRIP: 5 [PH] (ref 5–8)
PHOSPHATE SERPL-MCNC: 4 MG/DL (ref 2.5–4.9)
PLATELET # BLD: 108 10X3/UL (ref 130–400)
PMV BLD AUTO: 11.3 FL (ref 7.4–10.4)
POTASSIUM SERPL-SCNC: 3.7 MMOL/L (ref 3.5–5.1)
RBC # BLD AUTO: 2.54 10X6/UL (ref 4–5.4)
SODIUM SERPL-SCNC: 137 MMOL/L (ref 136–145)
SQUAMOUS #/AREA URNS HPF: (no result) HPF (ref 0–4)
UROBILINOGEN UR-MCNC: NORMAL MG/DL (ref ?–2)
WBC # BLD AUTO: 2.3 10X3/UL (ref 4.8–10.8)
WBC #/AREA URNS HPF: (no result) HPF (ref 0–4)

## 2021-04-09 NOTE — NUR
BLLOD TRANSFUSION COMPLETE PATIENT TOLERATED WELL WITH NO ADVERSE REACTIONS
NOTED. WILL CONTINUE TO MONITOR.

## 2021-04-09 NOTE — NUR
PATIENT BP LOW. RECHECK WITH MANUAL CUFF FOR 98/56. DENIES HYPOTENSIVE
SYMPTOMS. PATIENT APPEARS DIAPHORETIC. BLOOD GLUCOSE CHECKED, 66. APPLE JUICE
GIVEN TO PATIENT.

## 2021-04-09 NOTE — NUR
1 UNITS OF PRBC INITIATED PATIENT STATES SHE HAS RECEIVED BLOOD BEFORE WITH NO
ADVERSE REACTIONS NOTED. WILL CONTINUE TO MONITOR.

## 2021-05-12 ENCOUNTER — HOSPITAL ENCOUNTER (OUTPATIENT)
Dept: HOSPITAL 84 - D.SP | Age: 71
Discharge: HOME | End: 2021-05-12
Attending: INTERNAL MEDICINE
Payer: MEDICARE

## 2021-05-12 VITALS — SYSTOLIC BLOOD PRESSURE: 108 MMHG | DIASTOLIC BLOOD PRESSURE: 41 MMHG

## 2021-05-12 VITALS
BODY MASS INDEX: 29.96 KG/M2 | HEIGHT: 66 IN | HEIGHT: 66 IN | WEIGHT: 186.39 LBS | WEIGHT: 186.39 LBS | BODY MASS INDEX: 29.96 KG/M2

## 2021-05-12 DIAGNOSIS — R18.8: Primary | ICD-10-CM

## 2021-05-12 LAB
ALBUMIN SERPL-MCNC: 2.6 G/DL (ref 3.4–5)
ALP SERPL-CCNC: 88 U/L (ref 30–120)
ALT SERPL-CCNC: 16 U/L (ref 10–68)
ANION GAP SERPL CALC-SCNC: 12.5 MMOL/L (ref 8–16)
APTT BLD: 28.4 SECONDS (ref 22.8–39.4)
BASOPHILS NFR BLD AUTO: 0.3 % (ref 0–2)
BILIRUB SERPL-MCNC: 0.46 MG/DL (ref 0.2–1.3)
BUN SERPL-MCNC: 25 MG/DL (ref 7–18)
CALCIUM SERPL-MCNC: 8.7 MG/DL (ref 8.5–10.1)
CHLORIDE SERPL-SCNC: 103 MMOL/L (ref 98–107)
CO2 SERPL-SCNC: 26.4 MMOL/L (ref 21–32)
CREAT SERPL-MCNC: 1.7 MG/DL (ref 0.6–1.3)
EOSINOPHIL NFR BLD: 5 % (ref 0–7)
ERYTHROCYTE [DISTWIDTH] IN BLOOD BY AUTOMATED COUNT: 16.3 % (ref 11.5–14.5)
GLOBULIN SER-MCNC: 3.4 G/L
GLUCOSE SERPL-MCNC: 166 MG/DL (ref 74–106)
HCT VFR BLD CALC: 25.3 % (ref 36–48)
HGB BLD-MCNC: 8.2 G/DL (ref 12–16)
IMM GRANULOCYTES NFR BLD: 0.3 % (ref 0–5)
INR PPP: 1.3 (ref 0.85–1.17)
LYMPHOCYTES # BLD: 1.14 10X3/UL (ref 1.18–3.74)
LYMPHOCYTES NFR BLD AUTO: 35.5 % (ref 15–50)
MCH RBC QN AUTO: 29.3 PG (ref 26–34)
MCHC RBC AUTO-ENTMCNC: 32.4 G/DL (ref 31–37)
MCV RBC: 90.4 FL (ref 80–100)
MONOCYTES NFR BLD: 13.4 % (ref 2–11)
NEUTROPHILS # BLD: 1.46 10X3/UL (ref 1.56–6.13)
NEUTROPHILS NFR BLD AUTO: 45.5 % (ref 40–80)
OSMOLALITY SERPL CALC.SUM OF ELEC: 283 MOSM/KG (ref 275–300)
PLATELET # BLD: 142 10X3/UL (ref 130–400)
PMV BLD AUTO: 9.5 FL (ref 7.4–10.4)
POTASSIUM SERPL-SCNC: 3.9 MMOL/L (ref 3.5–5.1)
PROT SERPL-MCNC: 6 G/DL (ref 6.4–8.2)
PROTHROMBIN TIME: 15 SECONDS (ref 11.6–15)
RBC # BLD AUTO: 2.8 10X6/UL (ref 4–5.4)
SODIUM SERPL-SCNC: 138 MMOL/L (ref 136–145)
WBC # BLD AUTO: 3.2 10X3/UL (ref 4.8–10.8)

## 2021-06-01 NOTE — NUR
CONFIRMED PT PROCEDURE FOR 6/2/21
NPO AFTER MIDNIGHT: YES
: YES
ARRIVAL: 1000
THINNER: ASA HELD FOR PAST 5 DAYS

## 2021-06-02 ENCOUNTER — HOSPITAL ENCOUNTER (INPATIENT)
Dept: HOSPITAL 84 - D.SP | Age: 71
LOS: 2 days | Discharge: HOME | DRG: 406 | End: 2021-06-04
Attending: RADIOLOGY | Admitting: RADIOLOGY
Payer: MEDICARE

## 2021-06-02 VITALS — DIASTOLIC BLOOD PRESSURE: 50 MMHG | SYSTOLIC BLOOD PRESSURE: 127 MMHG

## 2021-06-02 VITALS — SYSTOLIC BLOOD PRESSURE: 116 MMHG | DIASTOLIC BLOOD PRESSURE: 38 MMHG

## 2021-06-02 VITALS — SYSTOLIC BLOOD PRESSURE: 117 MMHG | DIASTOLIC BLOOD PRESSURE: 43 MMHG

## 2021-06-02 VITALS — SYSTOLIC BLOOD PRESSURE: 125 MMHG | DIASTOLIC BLOOD PRESSURE: 51 MMHG

## 2021-06-02 VITALS — SYSTOLIC BLOOD PRESSURE: 120 MMHG | DIASTOLIC BLOOD PRESSURE: 35 MMHG

## 2021-06-02 VITALS — SYSTOLIC BLOOD PRESSURE: 110 MMHG | DIASTOLIC BLOOD PRESSURE: 35 MMHG

## 2021-06-02 VITALS — DIASTOLIC BLOOD PRESSURE: 31 MMHG | SYSTOLIC BLOOD PRESSURE: 119 MMHG

## 2021-06-02 VITALS
HEIGHT: 66 IN | HEIGHT: 66 IN | BODY MASS INDEX: 27.15 KG/M2 | WEIGHT: 168.95 LBS | BODY MASS INDEX: 27.15 KG/M2 | BODY MASS INDEX: 27.15 KG/M2 | WEIGHT: 168.95 LBS

## 2021-06-02 VITALS — DIASTOLIC BLOOD PRESSURE: 39 MMHG | SYSTOLIC BLOOD PRESSURE: 126 MMHG

## 2021-06-02 VITALS — DIASTOLIC BLOOD PRESSURE: 39 MMHG | SYSTOLIC BLOOD PRESSURE: 125 MMHG

## 2021-06-02 VITALS — DIASTOLIC BLOOD PRESSURE: 45 MMHG | SYSTOLIC BLOOD PRESSURE: 126 MMHG

## 2021-06-02 VITALS — SYSTOLIC BLOOD PRESSURE: 136 MMHG | DIASTOLIC BLOOD PRESSURE: 50 MMHG

## 2021-06-02 VITALS — SYSTOLIC BLOOD PRESSURE: 126 MMHG | DIASTOLIC BLOOD PRESSURE: 45 MMHG

## 2021-06-02 DIAGNOSIS — K75.81: Primary | ICD-10-CM

## 2021-06-02 DIAGNOSIS — D64.9: ICD-10-CM

## 2021-06-02 DIAGNOSIS — E11.65: ICD-10-CM

## 2021-06-02 DIAGNOSIS — K76.6: ICD-10-CM

## 2021-06-02 DIAGNOSIS — D61.818: ICD-10-CM

## 2021-06-02 DIAGNOSIS — R18.8: ICD-10-CM

## 2021-06-02 DIAGNOSIS — K74.60: ICD-10-CM

## 2021-06-02 LAB
ALBUMIN SERPL-MCNC: 2.9 G/DL (ref 3.4–5)
ALP SERPL-CCNC: 95 U/L (ref 30–120)
ALT SERPL-CCNC: 21 U/L (ref 10–68)
ANION GAP SERPL CALC-SCNC: 11.1 MMOL/L (ref 8–16)
APTT BLD: 31 SECONDS (ref 22.8–39.4)
BASOPHILS NFR BLD AUTO: 1.3 % (ref 0–2)
BILIRUB SERPL-MCNC: 0.63 MG/DL (ref 0.2–1.3)
BUN SERPL-MCNC: 29 MG/DL (ref 7–18)
CALCIUM SERPL-MCNC: 8.7 MG/DL (ref 8.5–10.1)
CHLORIDE SERPL-SCNC: 104 MMOL/L (ref 98–107)
CO2 SERPL-SCNC: 25.1 MMOL/L (ref 21–32)
CREAT SERPL-MCNC: 1.7 MG/DL (ref 0.6–1.3)
EOSINOPHIL NFR BLD: 4.5 % (ref 0–7)
ERYTHROCYTE [DISTWIDTH] IN BLOOD BY AUTOMATED COUNT: 17.3 % (ref 11.5–14.5)
GLOBULIN SER-MCNC: 3.3 G/L
GLUCOSE SERPL-MCNC: 154 MG/DL (ref 74–106)
HCT VFR BLD CALC: 27.7 % (ref 36–48)
HGB BLD-MCNC: 9.4 G/DL (ref 12–16)
INR PPP: 1.24 (ref 0.85–1.17)
LYMPHOCYTES # BLD: 0.8 10X3/UL (ref 1.18–3.74)
LYMPHOCYTES NFR BLD AUTO: 25.7 % (ref 15–50)
MCH RBC QN AUTO: 31.2 PG (ref 26–34)
MCHC RBC AUTO-ENTMCNC: 33.8 G/DL (ref 31–37)
MCV RBC: 92.4 FL (ref 80–100)
MONOCYTES NFR BLD: 13.2 % (ref 2–11)
NEUTROPHILS # BLD: 1.8 10X3/UL (ref 1.56–6.13)
NEUTROPHILS NFR BLD AUTO: 55.3 % (ref 40–80)
OSMOLALITY SERPL CALC.SUM OF ELEC: 280 MOSM/KG (ref 275–300)
PLATELET # BLD: 136 10X3/UL (ref 130–400)
PMV BLD AUTO: 8.4 FL (ref 7.4–10.4)
POTASSIUM SERPL-SCNC: 4.2 MMOL/L (ref 3.5–5.1)
PROT SERPL-MCNC: 6.2 G/DL (ref 6.4–8.2)
PROTHROMBIN TIME: 14.4 SECONDS (ref 11.6–15)
RBC # BLD AUTO: 2.99 10X6/UL (ref 4–5.4)
SODIUM SERPL-SCNC: 136 MMOL/L (ref 136–145)
WBC # BLD AUTO: 3.2 10X3/UL (ref 4.8–10.8)

## 2021-06-02 PROCEDURE — 0W9G3ZZ DRAINAGE OF PERITONEAL CAVITY, PERCUTANEOUS APPROACH: ICD-10-PCS | Performed by: RADIOLOGY

## 2021-06-02 PROCEDURE — 06183J4 BYPASS PORTAL VEIN TO HEPATIC VEIN WITH SYNTHETIC SUBSTITUTE, PERCUTANEOUS APPROACH: ICD-10-PCS | Performed by: RADIOLOGY

## 2021-06-02 NOTE — HEMODYNAMI
PATIENT:REESE GOMEZ                                    MEDICAL RECORD: [id number]
: 46                                            LOCATION:[facility]    [id number]                                       ADMISSION DATE: 21
 
 
 Generatedon:114:27
Patient name: REESE GOMEZ Patient #: [id number] Visit #: [id number] SSN: : 
1950 Date
of study: 2021
Page: Of
Hemodynamic Procedure Report
****************************
Patient Data
Patient Demographics
Procedure consent was obtained
First Name: REESE            Gender: Female
Last Name: JASON           : 1950
Middle Initial: D           Age: 71 year(s)
Patient #: [id number]       Race: Unknown
Visit #: [id number]
Accession #:
[id number]
Additional ID: [id number]
Contact details
Address: [address] [place] [address]   Phone: [phone]
State: [place]
City: [place]
Zip code: [address]
Past Medical History
Allergies
Allergen  Reaction  Date      Comments
Reported
Other               2021  statin,codeine,corticosteroids,asa,nsaid,steroids
allergy
Admission
Admission Data
Admission Date: 2021    Admission Time: 12:26
Room #: [facility]
Procedure
Procedure Types
Cath Procedure
Peripheral Cath Diagnostic Procedure
Cath Lab Peripheral Procedures
Liver
TIPSS
Procedure Description
Procedure Date
Procedure Date: 2021
Procedure Start Time: 13:04
Procedure Staff
Name                            Function
Delio Akins MD               Performing Physician
Diana Santiago RT                  Circulator
Patricia Kelley RN                 Nurse
Kasie Blackburn RN                Nurse
Nilesh Arias RT              Scrub
Procedure Data
Cath Procedure
Fluoroscopy
 
Diagnostic fluoroscopy      Total fluoroscopy Time:
time: 18.9 min              18.9 min
Diagnostic fluoroscopy      Total fluoroscopy dose:
dose: 1253 mGy              1253 mGy
Contrast Material
Contrast Material Type                       Amount (ml)
Isovue 300                                   40
Diagnostic catheters
Device Type               Used For           End Catheter
Placement
Merit UHF Pigtail VESSEL
SIZING 5Fr 65CM catheter
([id number])
Procedure Medications
Medication           Administration Route Dosage
Heparin Flush Bag    added to field       3 bags
(1000units/500ml NS)
Lidocaine 1%         added to field       20
Hemodynamics
Rest
Heart Rate: 62 (bpm)
Pressure Samples
Time     Site     Value (mmHg) Purpose      Heart      Use
Rate(bpm)
13:17    RA       13/12(11)    Snapshot     66
13:27    RA       22(19)    Snapshot     62
13:50    RA       (26)    Snapshot     57
14:11    RA       (23)    Snapshot     62
14:13    RA       (17)    Snapshot     61
Snapshots
Pre Cath      Intra         NCS           Post Cath
Medications
Time     Medication       Route  Dose  Verified Delivered Reason     Notes  Effe
ctiveness
by       by
12:55:07 Heparin Flush    added  3bags Delio Kebede     used for
Bag              to           Tashi Akins  procedure
(1000units/500ml field        MD       MD
NS)
12:55:22 Lidocaine 1%     added  20ml  Delio Kebede     for local
to     vial  Tashi Akins  anesthetic
field MD PRADO
Procedure Log
Time     Note
11:47:19 Use device set IR Diagnostic
11:47:50 Micropuncture VSI 4FR kit opened to sterile field.
11:47:51 BENTSON 145cm wire ([id number]) opened to sterile field.
11:47:52 Tegaderm 4 x 4 (1626W) opened to sterile field.
11:47:53 Sterile Angiographic Pack opened to sterile field.
11:47:56 Bag Decanter (2002S) opened to sterile field.
11:47:56 ACIST Manifold (37221) opened to sterile field.
11:47:59 ACIST Syringe (25675) opened to sterile field.
11:48:00 ACIST Hand Control (06447) opened to sterile field.
11:48:30 GLIDE WIRE ANGLE 260cm (YU1673) opened to sterile field.
11:48:30 KIT, TRANSJUGULAR LIVER ACCESS R opened to sterile field.
11:48:32 INFLATOR BasixTOUCH (UW7147) opened to sterile field.
11:48:41 AYOUB 260 wire (W95080) opened to sterile field.
11:49:40 TUBING Contrast Injection High Pressure (XOM251K) opened to sterile
field.
11:49:41 TUBING Contrast Injection High Pressure (ISQ592D) opened to sterile
 
field.
12:10:36 Time tracking: Regular hours (M-F 7:00 - 5:00)
12:10:51 Plan of Care:Hemodynamics will remain stable., Cardiac rhythm will
remain stable., Comfort level will be maintained., Respiratory function
will remain adequate., Patient/ family verbilizes understanding of
procedure., Procedure tolerated without complication., Recovers from
procedure without complications..
12:10:58 Patient received from Outpatients to IR Alert and oriented. Tansferred
to table in Supine position.
12:11:03 Signed procedure consent form obtained from patient.
12:11:16 H&P Date Dictated: 2021 Within 30 days and on chart., H&P Addendum
completed by physician on day of procedure. (MUST COMPLETE FOR ALL
OUTPATIENTS).
12:11:18 Pre-procedure instructions explained to patient.
12:11:19 Pre-op teaching completed and patient verbalized understanding.
12:11:21 Family in waiting room.
12:11:23 Patient NPO since Midnight.
12:12:31 Patient allergic to Other
allergystatin,codeine,corticosteroids,asa,nsaid,steroids
12:15:14 Is the patient allergic to Iodine/contrast media? No.
12:15:18 Is patient on blood thinner?No
12:16:15 Patient diabetic? No.
12:16:19 -----------------------------------------------------------------------
-
12:16:43 ----Pre-sedation anethsthesia assessment.----see anesthesia notes for
monitoring of patient during procedure
12:17:20 -----------------------------------------------------------------------
-
12:17:34 Left neck area was prepped with chlora-prep and draped in sterile
fashion
12:17:36 Alarms reviewed by SHEILA BOSWELL
12:54:18 A Merit UHF Pigtail VESSEL SIZING 5Fr 65CM catheter ([id number]) was
advanced over the wire and used for .
12:55:07 Heparin Flush Bag (1000units/500ml NS) 3bags added to field was
administered by Delio Akins MD; used for procedure; Verbal order read
back and verified.
12:55:22 Lidocaine 1% 20ml vial added to field was administered by Delio hoffman MD; for local anesthetic; Verbal order read back and verified.
12:55:47 Baseline sample Acquired.
12:59:39 -----------------------------------------------------------------------
-
12:59:42 Physician arrived
12:59:43 --------ALL STOP TIME OUT------
12:59:43 Final Timeout: patient, procedure, and site verified with staff and
physician. All members of the team are in agreement.
13:00:07 Fire Safety Assessment: A--An alcohol-based skin anteseptic being used
preoperatively., B--The operative or invasive procedure is being
performed above the xiphoid process or in the oropharynx., C--Open
oxygen or nitrous oxide is being used.
13:03:33 Procedure started.
13:03:33 Full Disclosure recording started
13:04:33 Local anesthetic to Abdominal area with Lidocaine 1% by Delio Akins MD.**INITIAL ACCESS ONLY**
13:11:00 SHEATH 6FR [place] (UEB769) opened to sterile field.
13:16:46 Zero performed for pressure channel P1
13:17:00 Zero performed for pressure channel P1
13:17:19 Zero performed for pressure channel P1
13:25:02 AMPLATZ Short Taper 260cm wire ([id number]) opened to sterile field.
13:27:23 GLIDE CATHETER 4FR Straight 65cm () opened to sterile field.
 
13:45:29 TORQUE DEVICE PLASTIC .038 ( TD01) opened to sterile field.
13:49:02 Zero performed for pressure channel P1
14:06:24 VIATORR 10x7 stent ([id number]) was deployed across Undefined1 .
14:07:13 Inflate balloon Inflation number: 1 A Evercross 8 x 6 x 135 Balloon
([id number]) was prepped and advanced across the Undefined1 , then
inflated .
14:07:44 STOPCOCK 3-Way Large Bore (V62628) opened to sterile field.
14:07:54 TUBING Contrast Injection High Pressure (OGF802Q) opened to sterile
field.
14:20:52 Procedure ended.(Physican Out)
14:22:04 Fluoroscopy time 18.90 minutes.
14:22:09 Fluoroscopy dose: 1253 mGy
14:22:09 Flurop Dose total: 1253
14:22:17 Contrast amount:Isovue 300 40ml.
14:22:19 Procedure and supply charges have been captured, reviewed, submitted an
d
are correct.
14:23:36 Report given to Recovery Room.
Intervention Summary
Intervention Notes
Time     ActionType  Lesion and  Equipment Used  Action#  Pressure  Duration
Attributes
14:06:24 Deploy self Undefined1  VIATORR 10x7    1
expanding               stent
stent                   ([id number])
14:07:13 Inflate     Undefined1  Evercross 8 x 6 1        0         00:00
balloon                 x 135 Balloon
([id number])
Device Usage
Item Name       Manufacture  Quantity  Catalog Number Hospital Part    Current M
inimal Lot# /
Charge   Number  Stock   Stock   Serial#
Code
Micropuncture   VSI VASCULAR 1         7266V          785869           937181  5
VSI 4FR kit     SOLUTIONS
BENTSON 145cm   T3 Search 1         W51284         076143           242249  5
wire (S52418)
Tegaderm 4 x 4  3M           1         1626W          211457   908207  395829  5
(1626W)
Sterile         Cardinal     1         [id number]     409309           456820  5
Angiographic    Health
Pack
Bag Decanter    Microtek     1         S          785750   68570   595889  5
(S)         Medical Inc.
ACIST Manifold  Acist        1         41202          683180   693367  377054  5
(94968)         Medical
Systems Inc
ACIST Syringe   Acist        1         21948          725559   000976  348276  2
0
(58549)         Medical
Systems Inc
ACIST Hand      Acist        1         29901          701286   111742  663745  5
Control (37527) Medical
Systems Inc
GLIDE WIRE      Terumo       1         QO2084         482871   969077  618691  5
ANGLE 260cm
(XW6668)
KIT,            [employer] 1         L64711         903420           814412  5
       [id number]
TRANSJUGULAR
 
LIVER ACCESS R
INFLATOR        Merit        1         VP5987         091256   052987  726384  5
[employer]
(TI5017)
AYOUB 260 wire  [employer] 1         A39918         524930   390482  679503  5
(D59525)
TUBING Contrast Merit        3         XBE769O        573109   647877  892165  5
Injection High  Medical
Pressure
(LIO347L)
Merit UHF       Merit        1         7602-20M65     834572           431844  5
Pigtail VESSEL  Medical
SIZING 5Fr 65CM
catheter
(110139E68)
SHEATH 6FR      Terumo       1         BEK755         435406   470801  072159  4
0
[place]
(HET471)
AMPLATZ Short   [place]       1         [id number]     008505   906743  350522  5
Taper 260cm     Scientific
wire
([id number])
GLIDE CATHETER  [employer]o       1                   373861           766942  5
4FR Straight
65cm ()
TORQUE DEVICE   [place]       1         TD01           595550   267366  124220  5
PLASTIC .038 (  Scientific
TD01)
VIATORR 10x7    [facility]    1         [id number]      193503   932765  324158  5
       [id number]
stent
([id number])
Evercross 8 x 6 Medtronic    1         [id number]  413389   867016  460491  5
x 135 Balloon
([id number])
STOPCOCK 3-Way  [employer] 1         G34450         965637   5360    540735  5
Large Bore
(M89215)
Signature Audit [place]
Stage           Time        Signature      Unsigned
Intra-Procedure 2021    Diana Santiago
2:27:28 PM  RT(R)
 
 
 
 
 
 
 
 
 
 
 
 
[facility]                                          
[address] [place] [address]

## 2021-06-02 NOTE — NUR
ARRIVED TO UNIT AROUND 1548. CONNECTED TO TELEMETRY MONITOR. SPOUSE AT
BEDSIDE. ON 2L 02 VIA NC. INCISION TO LEFT ANTERIOR NECK WITH DRESSING C/D/I.
ABLE TO ROLL TO SIDE TO USE BEDPAN. RESTING COMFORTABLY. WILL CONTINUE TO
MONITOR.

## 2021-06-03 VITALS — SYSTOLIC BLOOD PRESSURE: 117 MMHG | DIASTOLIC BLOOD PRESSURE: 67 MMHG

## 2021-06-03 VITALS — SYSTOLIC BLOOD PRESSURE: 127 MMHG | DIASTOLIC BLOOD PRESSURE: 58 MMHG

## 2021-06-03 VITALS — SYSTOLIC BLOOD PRESSURE: 94 MMHG | DIASTOLIC BLOOD PRESSURE: 33 MMHG

## 2021-06-03 VITALS — DIASTOLIC BLOOD PRESSURE: 36 MMHG | SYSTOLIC BLOOD PRESSURE: 91 MMHG

## 2021-06-03 VITALS — SYSTOLIC BLOOD PRESSURE: 93 MMHG | DIASTOLIC BLOOD PRESSURE: 32 MMHG

## 2021-06-03 VITALS — DIASTOLIC BLOOD PRESSURE: 33 MMHG | SYSTOLIC BLOOD PRESSURE: 96 MMHG

## 2021-06-03 VITALS — SYSTOLIC BLOOD PRESSURE: 94 MMHG | DIASTOLIC BLOOD PRESSURE: 36 MMHG

## 2021-06-03 VITALS — DIASTOLIC BLOOD PRESSURE: 24 MMHG | SYSTOLIC BLOOD PRESSURE: 93 MMHG

## 2021-06-03 VITALS — DIASTOLIC BLOOD PRESSURE: 28 MMHG | SYSTOLIC BLOOD PRESSURE: 96 MMHG

## 2021-06-03 VITALS — DIASTOLIC BLOOD PRESSURE: 38 MMHG | SYSTOLIC BLOOD PRESSURE: 89 MMHG

## 2021-06-03 VITALS — SYSTOLIC BLOOD PRESSURE: 102 MMHG | DIASTOLIC BLOOD PRESSURE: 35 MMHG

## 2021-06-03 VITALS — DIASTOLIC BLOOD PRESSURE: 36 MMHG | SYSTOLIC BLOOD PRESSURE: 94 MMHG

## 2021-06-03 VITALS — DIASTOLIC BLOOD PRESSURE: 40 MMHG | SYSTOLIC BLOOD PRESSURE: 100 MMHG

## 2021-06-03 VITALS — SYSTOLIC BLOOD PRESSURE: 99 MMHG | DIASTOLIC BLOOD PRESSURE: 31 MMHG

## 2021-06-03 VITALS — SYSTOLIC BLOOD PRESSURE: 119 MMHG | DIASTOLIC BLOOD PRESSURE: 47 MMHG

## 2021-06-03 VITALS — DIASTOLIC BLOOD PRESSURE: 33 MMHG | SYSTOLIC BLOOD PRESSURE: 93 MMHG

## 2021-06-03 VITALS — DIASTOLIC BLOOD PRESSURE: 32 MMHG | SYSTOLIC BLOOD PRESSURE: 95 MMHG

## 2021-06-03 VITALS — SYSTOLIC BLOOD PRESSURE: 113 MMHG | DIASTOLIC BLOOD PRESSURE: 43 MMHG

## 2021-06-03 VITALS — SYSTOLIC BLOOD PRESSURE: 113 MMHG | DIASTOLIC BLOOD PRESSURE: 45 MMHG

## 2021-06-03 VITALS — DIASTOLIC BLOOD PRESSURE: 46 MMHG | SYSTOLIC BLOOD PRESSURE: 114 MMHG

## 2021-06-03 VITALS — DIASTOLIC BLOOD PRESSURE: 32 MMHG | SYSTOLIC BLOOD PRESSURE: 91 MMHG

## 2021-06-03 VITALS — SYSTOLIC BLOOD PRESSURE: 110 MMHG | DIASTOLIC BLOOD PRESSURE: 38 MMHG

## 2021-06-03 VITALS — SYSTOLIC BLOOD PRESSURE: 110 MMHG | DIASTOLIC BLOOD PRESSURE: 35 MMHG

## 2021-06-03 VITALS — DIASTOLIC BLOOD PRESSURE: 31 MMHG | SYSTOLIC BLOOD PRESSURE: 100 MMHG

## 2021-06-03 VITALS — DIASTOLIC BLOOD PRESSURE: 28 MMHG | SYSTOLIC BLOOD PRESSURE: 85 MMHG

## 2021-06-03 VITALS — SYSTOLIC BLOOD PRESSURE: 89 MMHG | DIASTOLIC BLOOD PRESSURE: 39 MMHG

## 2021-06-03 VITALS — DIASTOLIC BLOOD PRESSURE: 47 MMHG | SYSTOLIC BLOOD PRESSURE: 123 MMHG

## 2021-06-03 VITALS — DIASTOLIC BLOOD PRESSURE: 39 MMHG | SYSTOLIC BLOOD PRESSURE: 94 MMHG

## 2021-06-03 VITALS — SYSTOLIC BLOOD PRESSURE: 104 MMHG | DIASTOLIC BLOOD PRESSURE: 41 MMHG

## 2021-06-03 VITALS — DIASTOLIC BLOOD PRESSURE: 32 MMHG | SYSTOLIC BLOOD PRESSURE: 122 MMHG

## 2021-06-03 LAB
ALBUMIN SERPL-MCNC: 2.9 G/DL (ref 3.4–5)
ALP SERPL-CCNC: 80 U/L (ref 30–120)
ALT SERPL-CCNC: 29 U/L (ref 10–68)
ANION GAP SERPL CALC-SCNC: 12.7 MMOL/L (ref 8–16)
BASOPHILS NFR BLD AUTO: 0.5 % (ref 0–2)
BILIRUB SERPL-MCNC: 0.5 MG/DL (ref 0.2–1.3)
BUN SERPL-MCNC: 30 MG/DL (ref 7–18)
CALCIUM SERPL-MCNC: 8.4 MG/DL (ref 8.5–10.1)
CHLORIDE SERPL-SCNC: 103 MMOL/L (ref 98–107)
CO2 SERPL-SCNC: 23.4 MMOL/L (ref 21–32)
CREAT SERPL-MCNC: 1.6 MG/DL (ref 0.6–1.3)
EOSINOPHIL NFR BLD: 0.1 % (ref 0–7)
ERYTHROCYTE [DISTWIDTH] IN BLOOD BY AUTOMATED COUNT: 16.9 % (ref 11.5–14.5)
ERYTHROCYTE [DISTWIDTH] IN BLOOD BY AUTOMATED COUNT: 17.1 % (ref 11.5–14.5)
GLOBULIN SER-MCNC: 2.6 G/L
GLUCOSE SERPL-MCNC: 258 MG/DL (ref 74–106)
HCT VFR BLD CALC: 24.2 % (ref 36–48)
HCT VFR BLD CALC: 28.4 % (ref 36–48)
HGB BLD-MCNC: 8.1 G/DL (ref 12–16)
HGB BLD-MCNC: 9.3 G/DL (ref 12–16)
LYMPHOCYTES # BLD: 0.5 10X3/UL (ref 1.18–3.74)
LYMPHOCYTES NFR BLD AUTO: 16 % (ref 15–50)
LYMPHOCYTES NFR BLD AUTO: 9 % (ref 15–50)
MCH RBC QN AUTO: 30.7 PG (ref 26–34)
MCH RBC QN AUTO: 31.2 PG (ref 26–34)
MCHC RBC AUTO-ENTMCNC: 32.9 G/DL (ref 31–37)
MCHC RBC AUTO-ENTMCNC: 33.5 G/DL (ref 31–37)
MCV RBC: 93.2 FL (ref 80–100)
MCV RBC: 93.4 FL (ref 80–100)
MONOCYTES NFR BLD: 6.1 % (ref 2–11)
NEUTROPHILS # BLD: 1.4 10X3/UL (ref 1.56–6.13)
NEUTROPHILS # BLD: 5 10X3/UL (ref 1.56–6.13)
NEUTROPHILS NFR BLD AUTO: 84 % (ref 40–80)
NEUTROPHILS NFR BLD AUTO: 84.3 % (ref 40–80)
OSMOLALITY SERPL CALC.SUM OF ELEC: 282 MOSM/KG (ref 275–300)
PLATELET # BLD EST: NORMAL 10*3/UL
PLATELET # BLD: 149 10X3/UL (ref 130–400)
PLATELET # BLD: 97 10X3/UL (ref 130–400)
PMV BLD AUTO: 8.9 FL (ref 7.4–10.4)
PMV BLD AUTO: 9.4 FL (ref 7.4–10.4)
POTASSIUM SERPL-SCNC: 5.1 MMOL/L (ref 3.5–5.1)
PROT SERPL-MCNC: 5.5 G/DL (ref 6.4–8.2)
RBC # BLD AUTO: 2.6 10X6/UL (ref 4–5.4)
RBC # BLD AUTO: 3.04 10X6/UL (ref 4–5.4)
SODIUM SERPL-SCNC: 134 MMOL/L (ref 136–145)
WBC # BLD AUTO: 2 10X3/UL (ref 4.8–10.8)
WBC # BLD AUTO: 5.9 10X3/UL (ref 4.8–10.8)

## 2021-06-03 NOTE — NUR
CALL RECEIVED FROM SHELTON RICHARDSON. PASSCODE VERIFIED. UPDATE GIVEN. WAS TOLD PT
WAS STABLE BUT NEEDED TO STAY ONE MORE NIGHT FOR MONITORING.

## 2021-06-03 NOTE — NUR
A&O X 4. REPORTS MILD DISCOMFORT. DRESSING TO LEFT NNECK CDI. AMBULATES
INDEPENDENTLY. SCDs APPLIED. DIASTOLIC BP 47. VS STABLE. CPOC.

## 2021-06-03 NOTE — NUR
SHIFT REPORT RECEIVED. PT LAYING ON RIGHT SIDE. AROUSES TO VOICE. ON ROOM AIR.
DENIES ANY PAIN AT INSITION SITE. PIV TO LEFT WRIST WITH NS AT 100ML/HR AT
THIS TIME. CALL LIGHT IN REACH. WILL CONTINUE TO MONITOR.

## 2021-06-04 VITALS — SYSTOLIC BLOOD PRESSURE: 102 MMHG | DIASTOLIC BLOOD PRESSURE: 50 MMHG

## 2021-06-04 VITALS — DIASTOLIC BLOOD PRESSURE: 44 MMHG | SYSTOLIC BLOOD PRESSURE: 99 MMHG

## 2021-06-04 VITALS — DIASTOLIC BLOOD PRESSURE: 39 MMHG | SYSTOLIC BLOOD PRESSURE: 102 MMHG

## 2021-06-04 VITALS — DIASTOLIC BLOOD PRESSURE: 44 MMHG | SYSTOLIC BLOOD PRESSURE: 103 MMHG

## 2021-06-04 VITALS — SYSTOLIC BLOOD PRESSURE: 108 MMHG | DIASTOLIC BLOOD PRESSURE: 45 MMHG

## 2021-06-04 VITALS — DIASTOLIC BLOOD PRESSURE: 36 MMHG | SYSTOLIC BLOOD PRESSURE: 98 MMHG

## 2021-06-04 LAB
ALBUMIN SERPL-MCNC: 2.7 G/DL (ref 3.4–5)
ALP SERPL-CCNC: 68 U/L (ref 30–120)
ALT SERPL-CCNC: 58 U/L (ref 10–68)
ANION GAP SERPL CALC-SCNC: 13.7 MMOL/L (ref 8–16)
BASOPHILS NFR BLD AUTO: 0.2 % (ref 0–2)
BILIRUB SERPL-MCNC: 0.43 MG/DL (ref 0.2–1.3)
BUN SERPL-MCNC: 37 MG/DL (ref 7–18)
CALCIUM SERPL-MCNC: 8.1 MG/DL (ref 8.5–10.1)
CHLORIDE SERPL-SCNC: 105 MMOL/L (ref 98–107)
CO2 SERPL-SCNC: 22.1 MMOL/L (ref 21–32)
CREAT SERPL-MCNC: 1.9 MG/DL (ref 0.6–1.3)
EOSINOPHIL NFR BLD: 0.1 % (ref 0–7)
ERYTHROCYTE [DISTWIDTH] IN BLOOD BY AUTOMATED COUNT: 17.4 % (ref 11.5–14.5)
GLOBULIN SER-MCNC: 2.5 G/L
GLUCOSE SERPL-MCNC: 187 MG/DL (ref 74–106)
HCT VFR BLD CALC: 26 % (ref 36–48)
HGB BLD-MCNC: 8.6 G/DL (ref 12–16)
LYMPHOCYTES # BLD: 0.7 10X3/UL (ref 1.18–3.74)
LYMPHOCYTES NFR BLD AUTO: 11.7 % (ref 15–50)
MCH RBC QN AUTO: 30.9 PG (ref 26–34)
MCHC RBC AUTO-ENTMCNC: 33.1 G/DL (ref 31–37)
MCV RBC: 93.3 FL (ref 80–100)
MONOCYTES NFR BLD: 10.5 % (ref 2–11)
NEUTROPHILS # BLD: 4.4 10X3/UL (ref 1.56–6.13)
NEUTROPHILS NFR BLD AUTO: 77.5 % (ref 40–80)
OSMOLALITY SERPL CALC.SUM OF ELEC: 285 MOSM/KG (ref 275–300)
PLATELET # BLD: 121 10X3/UL (ref 130–400)
PMV BLD AUTO: 9.6 FL (ref 7.4–10.4)
POTASSIUM SERPL-SCNC: 4.8 MMOL/L (ref 3.5–5.1)
PROT SERPL-MCNC: 5.2 G/DL (ref 6.4–8.2)
RBC # BLD AUTO: 2.79 10X6/UL (ref 4–5.4)
SODIUM SERPL-SCNC: 136 MMOL/L (ref 136–145)
WBC # BLD AUTO: 5.6 10X3/UL (ref 4.8–10.8)

## 2021-06-08 ENCOUNTER — HOSPITAL ENCOUNTER (EMERGENCY)
Dept: HOSPITAL 84 - D.ER | Age: 71
Discharge: HOME | End: 2021-06-08
Payer: MEDICARE

## 2021-06-08 VITALS
BODY MASS INDEX: 23.35 KG/M2 | HEIGHT: 66 IN | WEIGHT: 145.3 LBS | BODY MASS INDEX: 23.35 KG/M2 | WEIGHT: 145.3 LBS | HEIGHT: 66 IN

## 2021-06-08 VITALS — SYSTOLIC BLOOD PRESSURE: 126 MMHG | DIASTOLIC BLOOD PRESSURE: 29 MMHG

## 2021-06-08 DIAGNOSIS — Z79.84: ICD-10-CM

## 2021-06-08 DIAGNOSIS — K21.9: ICD-10-CM

## 2021-06-08 DIAGNOSIS — E11.40: ICD-10-CM

## 2021-06-08 DIAGNOSIS — B34.9: Primary | ICD-10-CM

## 2021-06-08 LAB
ALBUMIN SERPL-MCNC: 2.8 G/DL (ref 3.4–5)
ALP SERPL-CCNC: 91 U/L (ref 30–120)
ALT SERPL-CCNC: 30 U/L (ref 10–68)
ANION GAP SERPL CALC-SCNC: 14.4 MMOL/L (ref 8–16)
BACTERIA #/AREA URNS HPF: (no result) HPF
BASOPHILS NFR BLD AUTO: 0.5 % (ref 0–2)
BILIRUB SERPL-MCNC: 1.51 MG/DL (ref 0.2–1.3)
BILIRUB SERPL-MCNC: NEGATIVE MG/DL
BUN SERPL-MCNC: 28 MG/DL (ref 7–18)
CALCIUM SERPL-MCNC: 8.6 MG/DL (ref 8.5–10.1)
CHLORIDE SERPL-SCNC: 107 MMOL/L (ref 98–107)
CO2 SERPL-SCNC: 23.7 MMOL/L (ref 21–32)
CREAT SERPL-MCNC: 1.5 MG/DL (ref 0.6–1.3)
EOSINOPHIL NFR BLD: 3.2 % (ref 0–7)
ERYTHROCYTE [DISTWIDTH] IN BLOOD BY AUTOMATED COUNT: 17.5 % (ref 11.5–14.5)
GLOBULIN SER-MCNC: 2.8 G/L
GLUCOSE SERPL-MCNC: 236 MG/DL (ref 74–106)
HCT VFR BLD CALC: 29.7 % (ref 36–48)
HGB BLD-MCNC: 9.9 G/DL (ref 12–16)
KETONES UR STRIP-MCNC: NEGATIVE MG/DL
LYMPHOCYTES # BLD: 0.8 10X3/UL (ref 1.18–3.74)
LYMPHOCYTES NFR BLD AUTO: 15.4 % (ref 15–50)
MCH RBC QN AUTO: 31 PG (ref 26–34)
MCHC RBC AUTO-ENTMCNC: 33.4 G/DL (ref 31–37)
MCV RBC: 92.8 FL (ref 80–100)
MONOCYTES NFR BLD: 12.7 % (ref 2–11)
NEUTROPHILS # BLD: 3.4 10X3/UL (ref 1.56–6.13)
NEUTROPHILS NFR BLD AUTO: 68.2 % (ref 40–80)
NITRITE UR-MCNC: NEGATIVE MG/ML
OSMOLALITY SERPL CALC.SUM OF ELEC: 294 MOSM/KG (ref 275–300)
PH UR STRIP: 5 [PH] (ref 5–8)
PLATELET # BLD: 174 10X3/UL (ref 130–400)
PMV BLD AUTO: 8.3 FL (ref 7.4–10.4)
POTASSIUM SERPL-SCNC: 4.1 MMOL/L (ref 3.5–5.1)
PROT SERPL-MCNC: 5.6 G/DL (ref 6.4–8.2)
RBC # BLD AUTO: 3.2 10X6/UL (ref 4–5.4)
SODIUM SERPL-SCNC: 141 MMOL/L (ref 136–145)
UROBILINOGEN UR-MCNC: NORMAL MG/DL (ref ?–2)
WBC # BLD AUTO: 5 10X3/UL (ref 4.8–10.8)
WBC #/AREA URNS HPF: (no result) HPF (ref 0–4)

## 2021-06-10 ENCOUNTER — HOSPITAL ENCOUNTER (OUTPATIENT)
Dept: HOSPITAL 84 - D.US | Age: 71
Discharge: HOME | End: 2021-06-10
Attending: RADIOLOGY
Payer: MEDICARE

## 2021-06-10 VITALS — BODY MASS INDEX: 23.4 KG/M2

## 2021-06-10 DIAGNOSIS — K74.60: Primary | ICD-10-CM

## 2021-06-10 DIAGNOSIS — R18.8: ICD-10-CM

## 2021-06-10 LAB
ALBUMIN SERPL-MCNC: 2.4 G/DL (ref 3.4–5)
ALP SERPL-CCNC: 77 U/L (ref 30–120)
ALT SERPL-CCNC: 21 U/L (ref 10–68)
ANION GAP SERPL CALC-SCNC: 13.2 MMOL/L (ref 8–16)
BILIRUB SERPL-MCNC: 1.39 MG/DL (ref 0.2–1.3)
BUN SERPL-MCNC: 26 MG/DL (ref 7–18)
CALCIUM SERPL-MCNC: 7.7 MG/DL (ref 8.5–10.1)
CHLORIDE SERPL-SCNC: 107 MMOL/L (ref 98–107)
CO2 SERPL-SCNC: 24.7 MMOL/L (ref 21–32)
CREAT SERPL-MCNC: 1.5 MG/DL (ref 0.6–1.3)
GLOBULIN SER-MCNC: 2 G/L
GLUCOSE SERPL-MCNC: 194 MG/DL (ref 74–106)
OSMOLALITY SERPL CALC.SUM OF ELEC: 288 MOSM/KG (ref 275–300)
POTASSIUM SERPL-SCNC: 4.9 MMOL/L (ref 3.5–5.1)
PROT SERPL-MCNC: 4.4 G/DL (ref 6.4–8.2)
SODIUM SERPL-SCNC: 140 MMOL/L (ref 136–145)